# Patient Record
Sex: FEMALE | Race: WHITE | NOT HISPANIC OR LATINO | Employment: OTHER | URBAN - NONMETROPOLITAN AREA
[De-identification: names, ages, dates, MRNs, and addresses within clinical notes are randomized per-mention and may not be internally consistent; named-entity substitution may affect disease eponyms.]

---

## 2017-02-10 ENCOUNTER — TRANSCRIBE ORDERS (OUTPATIENT)
Dept: ADMINISTRATIVE | Facility: HOSPITAL | Age: 62
End: 2017-02-10

## 2017-02-10 DIAGNOSIS — Z12.31 ENCOUNTER FOR SCREENING MAMMOGRAM FOR MALIGNANT NEOPLASM OF BREAST: Primary | ICD-10-CM

## 2017-02-20 ENCOUNTER — HOSPITAL ENCOUNTER (OUTPATIENT)
Dept: MAMMOGRAPHY | Facility: HOSPITAL | Age: 62
Discharge: HOME | End: 2017-02-20
Attending: OBSTETRICS & GYNECOLOGY | Admitting: OBSTETRICS & GYNECOLOGY

## 2017-02-20 DIAGNOSIS — Z12.31 ENCOUNTER FOR SCREENING MAMMOGRAM FOR MALIGNANT NEOPLASM OF BREAST: ICD-10-CM

## 2017-02-20 PROCEDURE — G0202 SCR MAMMO BI INCL CAD: HCPCS

## 2017-02-20 PROCEDURE — 77063 BREAST TOMOSYNTHESIS BI: CPT

## 2017-02-21 ENCOUNTER — OFFICE VISIT (OUTPATIENT)
Dept: OBSTETRICS AND GYNECOLOGY | Facility: CLINIC | Age: 62
End: 2017-02-21

## 2017-02-21 VITALS
WEIGHT: 139 LBS | BODY MASS INDEX: 21.82 KG/M2 | DIASTOLIC BLOOD PRESSURE: 84 MMHG | HEIGHT: 67 IN | SYSTOLIC BLOOD PRESSURE: 126 MMHG

## 2017-02-21 DIAGNOSIS — Z85.41 HISTORY OF CERVICAL CANCER: ICD-10-CM

## 2017-02-21 DIAGNOSIS — Z87.412 H/O VULVAR DYSPLASIA: ICD-10-CM

## 2017-02-21 DIAGNOSIS — F17.200 SMOKER: ICD-10-CM

## 2017-02-21 DIAGNOSIS — Z01.419 WELL WOMAN EXAM WITH ROUTINE GYNECOLOGICAL EXAM: Primary | ICD-10-CM

## 2017-02-21 PROBLEM — C51.9 VULVAR CANCER (HCC): Status: ACTIVE | Noted: 2017-02-21

## 2017-02-21 PROCEDURE — G0123 SCREEN CERV/VAG THIN LAYER: HCPCS | Performed by: NURSE PRACTITIONER

## 2017-02-21 PROCEDURE — G0101 CA SCREEN;PELVIC/BREAST EXAM: HCPCS | Performed by: NURSE PRACTITIONER

## 2017-02-21 RX ORDER — AMLODIPINE BESYLATE 10 MG/1
10 TABLET ORAL DAILY
COMMUNITY
End: 2022-06-29

## 2017-02-21 RX ORDER — CARVEDILOL 25 MG/1
25 TABLET ORAL 2 TIMES DAILY WITH MEALS
COMMUNITY

## 2017-02-21 RX ORDER — ALPRAZOLAM 1 MG/1
1 TABLET ORAL 3 TIMES DAILY PRN
COMMUNITY

## 2017-02-21 RX ORDER — OXYCODONE AND ACETAMINOPHEN 10; 325 MG/1; MG/1
1 TABLET ORAL EVERY 6 HOURS PRN
COMMUNITY
End: 2018-09-05

## 2017-02-21 RX ORDER — LISINOPRIL 10 MG/1
10 TABLET ORAL DAILY
COMMUNITY
End: 2017-08-22 | Stop reason: SDDI

## 2017-02-21 NOTE — PROGRESS NOTES
Subjective   Robert Daniels is a 62 y.o. female  YOB: 1955    Est PT is here today for yearly visit.  Pt states that she is doing good with no c/o  Pt does need order for Mammo today as well      Chief Complaint   Patient presents with   • Gynecologic Exam     Here for annual exam, pap smear. LPS 12-9-15 WNL  Had hysterectomy for cervical cancer. Hx of vulvar cancer, Had mammogram yesterday.  Noticed recently a small place on anus that seems like a small wart.        HPI    The following portions of the patient's history were reviewed and updated as appropriate: allergies, current medications, past family history, past medical history, past social history, past surgical history and problem list.    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     Social History Main Topics   • Smoking status: Current Every Day Smoker     Packs/day: 1.00     Types: Cigarettes   • Smokeless tobacco: Never Used   • Alcohol use No   • Drug use: No   • Sexual activity: Not on file     Other Topics Concern   • Not on file     Social History Narrative         Current Outpatient Prescriptions:   •  ALPRAZolam (XANAX) 1 MG tablet, Take 1 mg by mouth Daily., Disp: , Rfl:   •  amLODIPine (NORVASC) 10 MG tablet, Take 10 mg by mouth Daily., Disp: , Rfl:   •  carvedilol (COREG) 25 MG tablet, Take 25 mg by mouth 2 (Two) Times a Day With Meals., Disp: , Rfl:    •  KRILL OIL PO, Take 1 tablet/day by mouth Daily., Disp: , Rfl:   •  lisinopril (PRINIVIL,ZESTRIL) 10 MG tablet, Take 10 mg by mouth Daily., Disp: , Rfl:   •  metFORMIN (GLUCOPHAGE) 500 MG tablet, Take 500 mg by mouth 2 (Two) Times a Day With Meals., Disp: , Rfl:   •  oxyCODONE-acetaminophen (PERCOCET)  MG per tablet, Take 1 tablet by mouth Every 6 (Six) Hours As Needed for moderate pain (4-6) (as needed)., Disp: , Rfl:     Menstrual History:  OB History      Para Term  AB TAB SAB Ectopic Multiple Living    7 7 7 0 0 0 0 0 0 7           No LMP recorded. Patient has had a hysterectomy.    Sexual History:         Could not be calculated    Past Surgical History   Procedure Laterality Date   •  section     • Hysterectomy       Due to cancer.  STEVEN BSO       Review of Systems   Constitutional: Negative for activity change, appetite change, chills, diaphoresis, fatigue, fever and unexpected weight change.   HENT: Negative for congestion, ear discharge, ear pain, facial swelling, hearing loss, mouth sores, nosebleeds, postnasal drip, rhinorrhea, sinus pressure, sneezing, sore throat, tinnitus, trouble swallowing and voice change.    Eyes: Negative for photophobia, pain, discharge, redness, itching and visual disturbance.   Respiratory: Negative for apnea, cough, choking, chest tightness and shortness of breath.    Cardiovascular: Negative for chest pain, palpitations and leg swelling.   Gastrointestinal: Negative for abdominal distention, abdominal pain, anal bleeding, blood in stool, constipation, diarrhea, nausea, rectal pain and vomiting.   Endocrine: Negative for cold intolerance and heat intolerance.   Genitourinary: Negative for decreased urine volume, difficulty urinating, dyspareunia, flank pain, frequency, genital sores, hematuria, menstrual problem, pelvic pain, urgency, vaginal bleeding, vaginal discharge and vaginal pain.   Musculoskeletal: Negative for arthralgias, back pain,  joint swelling and myalgias.   Skin: Negative for color change and rash.   Allergic/Immunologic: Negative for environmental allergies.   Neurological: Negative for dizziness, syncope, weakness, numbness and headaches.   Hematological: Negative for adenopathy.   Psychiatric/Behavioral: Negative for agitation, confusion and sleep disturbance. The patient is not nervous/anxious.        Objective   Physical Exam   Constitutional: She is oriented to person, place, and time. She appears well-developed and well-nourished.   HENT:   Head: Normocephalic.   Right Ear: External ear normal.   Left Ear: External ear normal.   Nose: Nose normal.   Mouth/Throat: Oropharynx is clear and moist.   Eyes: Conjunctivae are normal. Pupils are equal, round, and reactive to light.   Neck: Normal range of motion. Neck supple. Carotid bruit is not present. No thyromegaly present.   Cardiovascular: Regular rhythm, normal heart sounds and intact distal pulses.    No murmur heard.  Pulmonary/Chest: Effort normal and breath sounds normal. No respiratory distress. Right breast exhibits no inverted nipple, no mass, no nipple discharge, no skin change and no tenderness. Left breast exhibits no inverted nipple, no mass, no nipple discharge, no skin change and no tenderness. Breasts are symmetrical. There is no breast swelling.   Abdominal: Soft. Bowel sounds are normal. She exhibits no distension and no mass. There is no tenderness. There is no guarding. No hernia. Hernia confirmed negative in the right inguinal area and confirmed negative in the left inguinal area.   Genitourinary: Vagina normal. Rectal exam shows no external hemorrhoid, no internal hemorrhoid, no fissure, no mass, no tenderness and anal tone normal. No breast tenderness, discharge or bleeding. There is no rash, tenderness, lesion or injury on the right labia. There is no rash, tenderness, lesion or injury on the left labia. No vaginal discharge found.   Genitourinary Comments:  "Cervix, Uterus and Adnexa are surgically absent.  Urethra and urethral meatus normal  Bladder, normal no prolapse  Perineum and anus examined and without lesions   Musculoskeletal: Normal range of motion. She exhibits no edema or tenderness.   Lymphadenopathy:        Head (right side): No submental, no submandibular, no tonsillar, no preauricular, no posterior auricular and no occipital adenopathy present.        Head (left side): No submental, no submandibular, no tonsillar, no preauricular, no posterior auricular and no occipital adenopathy present.     She has no cervical adenopathy.        Right cervical: No superficial cervical, no deep cervical and no posterior cervical adenopathy present.       Left cervical: No superficial cervical, no deep cervical and no posterior cervical adenopathy present.     She has no axillary adenopathy.        Right: No inguinal adenopathy present.        Left: No inguinal adenopathy present.   Neurological: She is alert and oriented to person, place, and time. Coordination normal.   Skin: Skin is warm and dry. No bruising and no rash noted. No erythema.   Psychiatric: She has a normal mood and affect. Her behavior is normal. Judgment and thought content normal.   Nursing note and vitals reviewed.        Vitals:    02/21/17 0831   BP: 126/84   BP Location: Left arm   Patient Position: Sitting   Cuff Size: Adult   Weight: 139 lb (63 kg)   Height: 66.5\" (168.9 cm)       Robert was seen today for gynecologic exam.    Diagnoses and all orders for this visit:    Well woman exam with routine gynecological exam  Comments:  Normal well woman exam.  Thin prep done - hx of cervical ('94) and vulvar cancer ('09).  Mammogram done yesterday - wnl.    Orders:  -     Liquid-based Pap Smear, Screening        Body mass index is 22.1 kg/(m^2).     Non-Smoker    MyChart Instructions Given       "

## 2017-02-22 LAB
GEN CATEG CVX/VAG CYTO-IMP: NORMAL
LAB AP CASE REPORT: NORMAL
LAB AP GYN ADDITIONAL INFORMATION: NORMAL
Lab: NORMAL
PATH INTERP SPEC-IMP: NORMAL
STAT OF ADQ CVX/VAG CYTO-IMP: NORMAL

## 2017-08-22 ENCOUNTER — OFFICE VISIT (OUTPATIENT)
Dept: VASCULAR SURGERY | Facility: CLINIC | Age: 62
End: 2017-08-22

## 2017-08-22 VITALS
HEART RATE: 66 BPM | SYSTOLIC BLOOD PRESSURE: 132 MMHG | DIASTOLIC BLOOD PRESSURE: 78 MMHG | BODY MASS INDEX: 21.35 KG/M2 | HEIGHT: 67 IN | WEIGHT: 136 LBS

## 2017-08-22 DIAGNOSIS — I10 ESSENTIAL HYPERTENSION: ICD-10-CM

## 2017-08-22 DIAGNOSIS — Z72.0 TOBACCO ABUSE: ICD-10-CM

## 2017-08-22 DIAGNOSIS — I71.40 ABDOMINAL AORTIC ANEURYSM (AAA) WITHOUT RUPTURE (HCC): Primary | ICD-10-CM

## 2017-08-22 DIAGNOSIS — I65.23 BILATERAL CAROTID ARTERY STENOSIS: ICD-10-CM

## 2017-08-22 PROCEDURE — 99204 OFFICE O/P NEW MOD 45 MIN: CPT | Performed by: SURGERY

## 2017-08-22 PROCEDURE — 99406 BEHAV CHNG SMOKING 3-10 MIN: CPT | Performed by: SURGERY

## 2017-08-22 RX ORDER — CITALOPRAM 40 MG/1
20 TABLET ORAL DAILY
COMMUNITY

## 2017-08-22 RX ORDER — ASPIRIN 81 MG/1
81 TABLET ORAL DAILY
COMMUNITY
End: 2020-03-05

## 2017-08-22 NOTE — PROGRESS NOTES
2017      Titi Bullock MD  [address] [place] [address]    Robert Daniels  1955    Chief Complaint   Patient presents with   • Aortic Aneurysm     New referral per Dr Bullock.Has had back and leg pain.       Dear Titi Bullock MD:      HPI  I had the pleasure of seeing your patient Robert Daniels in the office today.  Thank you kindly for this consultation.  As you recall, Robert Daniels is a 62 y.o.  female who you are currently following for routine health maintenance.  She has a history of back problems and was having pain down both legs.  She had an incidental finding of a saccular 1.7 cm aneurysm.  She does smoke about 3/4 pack of cigarettes per day.  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  STEVEN BSO       Family History   Problem Relation Age of Onset   • Breast cancer Sister      Unknown   • Hypertension Mother    • Heart attack Brother    • Heart attack Brother    • Heart attack Brother    • Heart attack Brother    • Colon cancer Neg Hx    • Ovarian cancer Neg Hx        Social History     Social History   • Marital status:      Spouse name: N/A   • Number of children: N/A   • Years of education: N/A     Occupational History   • Not on file.     Social History Main Topics   • Smoking status: Current Every Day Smoker     Packs/day: 0.75     Types: Cigarettes   • Smokeless tobacco: Never Used   • Alcohol use No   • Drug use: No   • Sexual activity: Defer     Other Topics Concern   • Not on file     Social History  "Narrative       Allergies   Allergen Reactions   • Pravachol [Pravastatin Sodium] Myalgia       Prior to Admission medications    Medication Sig Start Date End Date Taking? Authorizing Provider   ALPRAZolam (XANAX) 1 MG tablet Take 1 mg by mouth Daily.   Yes Historical Provider, MD   amLODIPine (NORVASC) 10 MG tablet Take 10 mg by mouth Daily.   Yes Historical Provider, MD   aspirin 81 MG EC tablet Take 81 mg by mouth Daily.   Yes Historical Provider, MD   carvedilol (COREG) 25 MG tablet Take 25 mg by mouth 2 (Two) Times a Day With Meals.   Yes Historical Provider, MD   citalopram (CeleXA) 40 MG tablet Take 40 mg by mouth Daily.   Yes Historical Provider, MD   KRILL OIL PO Take 1 tablet/day by mouth Daily.   Yes Historical Provider, MD   metFORMIN (GLUCOPHAGE) 500 MG tablet Take 500 mg by mouth 2 (Two) Times a Day With Meals.   Yes Historical Provider, MD   oxyCODONE-acetaminophen (PERCOCET)  MG per tablet Take 1 tablet by mouth Every 6 (Six) Hours As Needed for moderate pain (4-6) (as needed).   Yes Historical Provider, MD   lisinopril (PRINIVIL,ZESTRIL) 10 MG tablet Take 10 mg by mouth Daily.  8/22/17  Historical Provider, MD       Review of Systems   Constitutional: Negative.    HENT: Negative.    Eyes: Negative.    Respiratory: Negative.    Cardiovascular: Negative.    Gastrointestinal: Negative.    Endocrine: Negative.    Genitourinary: Negative.    Musculoskeletal: Positive for back pain.   Skin: Negative.    Allergic/Immunologic: Negative.    Neurological: Negative.    Hematological: Negative.    Psychiatric/Behavioral: Negative.        /78  Pulse 66  Ht 66.5\" (168.9 cm)  Wt 136 lb (61.7 kg)  BMI 21.62 kg/m2  Physical Exam   Constitutional: She is oriented to person, place, and time. She appears well-developed and well-nourished. No distress.   HENT:   Head: Normocephalic and atraumatic.   Mouth/Throat: Oropharynx is clear and moist.   Eyes: Pupils are equal, round, and reactive to light. No " scleral icterus.   Neck: Normal range of motion. Neck supple. No JVD present. Carotid bruit is not present. No thyromegaly present.   Cardiovascular: Normal rate, regular rhythm, S2 normal, normal heart sounds, intact distal pulses and normal pulses.  Exam reveals no gallop and no friction rub.    No murmur heard.  Pulmonary/Chest: Effort normal and breath sounds normal.   Abdominal: Soft. Normal aorta and bowel sounds are normal. There is no hepatosplenomegaly.   Musculoskeletal: Normal range of motion.   Neurological: She is alert and oriented to person, place, and time. No cranial nerve deficit.   Skin: Skin is warm and dry. She is not diaphoretic.   Psychiatric: She has a normal mood and affect. Her behavior is normal. Judgment and thought content normal.   Nursing note and vitals reviewed.      No results found.    Patient Active Problem List   Diagnosis   • H/O vulvar dysplasia   • History of cervical cancer   • Tobacco abuse   • Hypertension   • Diabetes mellitus   • Aneurysm of infrarenal abdominal aorta         ICD-10-CM ICD-9-CM   1. Abdominal aortic aneurysm (AAA) without rupture I71.4 441.4   2. Bilateral carotid artery stenosis I65.23 433.10     433.30   3. Essential hypertension I10 401.9   4. Tobacco abuse Z72.0 305.1           Plan: After thoroughly evaluating Robert Daniels, I believe the best course of action is to Proceed with further testing.  I like for her to undergo a CTA of the abdomen and pelvis to better evaluate this saccular aneurysm.  I will see her back in the next week or so to determine if anything endovascularly needs to be done.  I would also like for her to undergo a carotid duplex to check for carotid occlusive disease. I did  extensively on smoking cessation, and the patient was advised of the continued risks of smoking.  I provided over 10 minutes counseling on this matter.  I also counseled her on risk factor modification with regards to her hypertension and diabetes  mellitus.  The patient can continue taking her current medication regimen as previously planned.  This was all discussed in full with complete understanding.    Thank you for allowing me to participate in the care of your patient.  Please do not hesitate with any questions or concerns.  I will keep you aware of any further encounters with Robert Daniels.        Sincerely yours,         DO Titi Ayoub MD

## 2017-08-29 ENCOUNTER — HOSPITAL ENCOUNTER (OUTPATIENT)
Dept: ULTRASOUND IMAGING | Facility: HOSPITAL | Age: 62
Discharge: HOME | End: 2017-08-29

## 2017-08-29 ENCOUNTER — HOSPITAL ENCOUNTER (OUTPATIENT)
Dept: CT IMAGING | Facility: HOSPITAL | Age: 62
Discharge: HOME | End: 2017-08-29
Admitting: NURSE PRACTITIONER

## 2017-08-29 ENCOUNTER — OFFICE VISIT (OUTPATIENT)
Dept: VASCULAR SURGERY | Facility: CLINIC | Age: 62
End: 2017-08-29

## 2017-08-29 VITALS
HEART RATE: 96 BPM | SYSTOLIC BLOOD PRESSURE: 142 MMHG | DIASTOLIC BLOOD PRESSURE: 96 MMHG | WEIGHT: 135 LBS | BODY MASS INDEX: 21.19 KG/M2 | HEIGHT: 67 IN | OXYGEN SATURATION: 97 %

## 2017-08-29 DIAGNOSIS — Z72.0 TOBACCO ABUSE: ICD-10-CM

## 2017-08-29 DIAGNOSIS — I71.40 ABDOMINAL AORTIC ANEURYSM (AAA) WITHOUT RUPTURE (HCC): ICD-10-CM

## 2017-08-29 DIAGNOSIS — I71.40 ABDOMINAL AORTIC ANEURYSM (AAA) WITHOUT RUPTURE (HCC): Primary | ICD-10-CM

## 2017-08-29 DIAGNOSIS — I65.23 BILATERAL CAROTID ARTERY STENOSIS: ICD-10-CM

## 2017-08-29 DIAGNOSIS — I10 ESSENTIAL HYPERTENSION: ICD-10-CM

## 2017-08-29 LAB — CREAT BLDA-MCNC: 0.7 MG/DL (ref 0.6–1.3)

## 2017-08-29 PROCEDURE — 93880 EXTRACRANIAL BILAT STUDY: CPT | Performed by: SURGERY

## 2017-08-29 PROCEDURE — 99213 OFFICE O/P EST LOW 20 MIN: CPT | Performed by: NURSE PRACTITIONER

## 2017-08-29 PROCEDURE — 82565 ASSAY OF CREATININE: CPT

## 2017-08-29 PROCEDURE — 74174 CTA ABD&PLVS W/CONTRAST: CPT

## 2017-08-29 PROCEDURE — 93880 EXTRACRANIAL BILAT STUDY: CPT

## 2017-08-29 PROCEDURE — 0 IOPAMIDOL PER 1 ML: Performed by: NURSE PRACTITIONER

## 2017-08-29 RX ADMIN — IOPAMIDOL 150 ML: 755 INJECTION, SOLUTION INTRAVENOUS at 13:45

## 2017-08-29 NOTE — PROGRESS NOTES
"08/29/2017      Titi Bullock MD  [address]  [facility] [address]        Robert Daniels  1955    Chief Complaint   Patient presents with   • Follow-up     Patient denies any stroke like symptoms or leg pain. US Carotid Bilateral and CT Angiogram Abdomen Pelvis With & Without Contrast 08/29/17   • Aortic Aneurysm       Dear Titi Bullock MD:    HPI     I had the pleasure of seeing you patient in the office today for follow up.  As you recall, the patient is a 62 y.o. female who we recently saw for an abdominal aortic aneurysm.  She has a history of back problems and was having pain down both legs.  She had an incidental finding of a saccular 1.7 cm aneurysm found on a noncontrast CT.  She does smoke about 3/4 pack of cigarettes per day.  She denies any family history of aneurysms.    She did have noninvasive testing performed today, which I did review in office.     /96  Pulse 96  Ht 66.5\" (168.9 cm)  Wt 135 lb (61.2 kg)  SpO2 97%  BMI 21.46 kg/m2  Physical Exam  Constitutional: She is oriented to person, place, and time. She appears well-developed and well-nourished. No distress.   HENT:   Head: Normocephalic and atraumatic.   Mouth/Throat: Oropharynx is clear and moist.   Eyes: Pupils are equal, round, and reactive to light. No scleral icterus.   Neck: Normal range of motion. Neck supple. No JVD present. Carotid bruit is not present. No thyromegaly present.   Cardiovascular: Normal rate, regular rhythm, S2 normal, normal heart sounds, intact distal pulses and normal pulses.  Exam reveals no gallop and no friction rub.    No murmur heard.  Pulmonary/Chest: Effort normal and breath sounds normal.   Abdominal: Soft. Normal aorta and bowel sounds are normal. There is no hepatosplenomegaly.   Musculoskeletal: Normal range of motion.   Neurological: She is alert and oriented to person, place, and time. No cranial nerve deficit.   Skin: Skin is warm and dry. She is not diaphoretic. "   Psychiatric: She has a normal mood and affect. Her behavior is normal. Judgment and thought content normal.   Nursing note and vitals reviewed.    DIAGNOSTIC DATA:    Ct Angiogram Abdomen Pelvis With & Without Contrast    Result Date: 8/29/2017  Narrative: EXAMINATION: CT ANGIOGRAM ABDOMEN PELVIS W WO CONTRAST-   8/29/2017 12:46 PM CDT  HISTORY: AAA; I71.4-Abdominal aortic aneurysm, without rupture  In order to have a CT radiation dose as low as reasonably achievable Automated Exposure Control was utilized for adjustment of the mA and/or KV according to patient size.  DLP in mGycm= 506.  CT angiography protocol. CT imaging with bolus IV contrast injection. Under concurrent supervision axial, sagittal, coronal, and three-dimensional data sets were constructed.  The aorta shows diffuse atherosclerotic irregularity and wall calcification with a maximum diameter (3.5 cm below the renal arteries) of 4.4 x 3.6 cm. Slightly more distal the aorta diameter is 3.4 x 3.4 cm. Moderate mural thrombus is present. No iliac artery dilation.  The upper abdominal aorta just below the level of the diaphragm measures 3.2 x 3.3 cm.  The lower 60% of the right kidney is atrophic and hypoperfused representing chronic ischemic change. The upper pole of the right kidney is normally perfused. The left kidney shows normal size and cortical enhancement.  Normal heart size. Mildly hyperexpanded lung bases. Normal liver, pancreas, and spleen. Mildly distended gallbladder with small calcified gallstones. No biliary dilation. Normal and symmetric adrenal glands.  No bowel dilation. No appendicitis or diverticulitis. No pelvic mass or fluid.  Summary: 1. Atherosclerotic irregularity and wall thickening throughout the aorta with maximum distal diameter of 4.4 x 3.6 cm. 2. Chronic ischemia of the lower half of the right kidney. 3. Gallstones.          This report was finalized on 08/29/2017 13:57 by Dr. Lucio Cheng MD.      Patient Active Problem  List   Diagnosis   • H/O vulvar dysplasia   • History of cervical cancer   • Tobacco abuse   • Hypertension   • Diabetes mellitus   • Aneurysm of infrarenal abdominal aorta         ICD-10-CM ICD-9-CM   1. Abdominal aortic aneurysm (AAA) without rupture I71.4 441.4   2. Tobacco abuse Z72.0 305.1   3. Essential hypertension I10 401.9       PLAN: After thoroughly evaluating Robert Daniels, I believe the best course of action is to remain conservative from a vascular standpoint.  We will see Robert Daniels back in 1 year with repeat noninvasive testing for continued surveillance.  Dr. Ruiz did review her CTA.  We did discuss smoking being the number one cause of growth.  We also discussed vascular risk factors as they pertain to progression of vascular disease including controlling her hypertension, diabetes mellitus, and smoking cessation.  The patient is to continue taking their medications as previously discussed.   This was all discussed in full with complete understanding.  Thank you for allowing me to participate in the care of your patient.  Please do not hesitate to call with any questions or concerns.  We will keep you aware of any further encounters with Robert Daniels.      Sincerely Yours,      JASON Palumbo

## 2017-08-30 ENCOUNTER — TELEPHONE (OUTPATIENT)
Dept: VASCULAR SURGERY | Facility: CLINIC | Age: 62
End: 2017-08-30

## 2017-08-30 NOTE — TELEPHONE ENCOUNTER
Patient called inquired why there was a difference in test from her hospital and testing with contrast for AAA.Informed dye increased accuracy of test.Reinforced importance of smoking cessation and keeping blood pressure under control.She is to follow up with repeat testing next year.Voiced understanding.

## 2018-04-27 DIAGNOSIS — Z12.31 VISIT FOR SCREENING MAMMOGRAM: Primary | ICD-10-CM

## 2018-04-27 NOTE — PROGRESS NOTES
2/21/17 was pt last yearly with [facility] she has medicare and next yearly due 2/2019 she request mammogram order

## 2018-04-30 ENCOUNTER — HOSPITAL ENCOUNTER (OUTPATIENT)
Dept: MAMMOGRAPHY | Facility: HOSPITAL | Age: 63
Discharge: HOME | End: 2018-04-30
Admitting: NURSE PRACTITIONER

## 2018-04-30 DIAGNOSIS — Z12.31 VISIT FOR SCREENING MAMMOGRAM: ICD-10-CM

## 2018-04-30 PROCEDURE — 77067 SCR MAMMO BI INCL CAD: CPT

## 2018-04-30 PROCEDURE — 77063 BREAST TOMOSYNTHESIS BI: CPT

## 2018-05-17 ENCOUNTER — TELEPHONE (OUTPATIENT)
Dept: VASCULAR SURGERY | Facility: CLINIC | Age: 63
End: 2018-05-17

## 2018-05-17 NOTE — TELEPHONE ENCOUNTER
Patient called regarding test she had at [facility]. Asked if any change in aneurysm size.Informed it was the same as 8/29/17 study.

## 2018-05-29 NOTE — PROGRESS NOTES
Subjective    Ms. Daniels is 63 y.o. female    Chief Complaint: Atrophic Kidney    History of Present Illness     Abnormal radiographic finding   This patient presents with a possible abnormal finding of the right kidneyon CT lumbar spine. This is a  new finding. This test was done 2 week(s) ago. Onset is sudden. This was done in context of evaluation for trauma. Symptoms include Back pain.     The following portions of the patient's history were reviewed and updated as appropriate: allergies, current medications, past family history, past medical history, past social history, past surgical history and problem list.    Review of Systems   Constitutional: Negative for appetite change, diaphoresis and fever.   HENT: Negative for facial swelling and sore throat.    Eyes: Negative for discharge and visual disturbance.   Respiratory: Negative for cough and shortness of breath.    Cardiovascular: Negative for chest pain and leg swelling.   Gastrointestinal: Negative for anal bleeding and vomiting.   Endocrine: Negative for cold intolerance and heat intolerance.   Genitourinary: Positive for flank pain. Negative for frequency, hematuria, pelvic pain and urgency.   Musculoskeletal: Negative for back pain and gait problem.   Skin: Negative for pallor and rash.   Allergic/Immunologic: Negative for food allergies and immunocompromised state.   Neurological: Negative for seizures and headaches.   Hematological: Negative for adenopathy. Does not bruise/bleed easily.   Psychiatric/Behavioral: Negative for dysphoric mood, self-injury and suicidal ideas.         Current Outpatient Prescriptions:   •  ALPRAZolam (XANAX) 1 MG tablet, Take 1 mg by mouth Daily., Disp: , Rfl:   •  amLODIPine (NORVASC) 10 MG tablet, Take 10 mg by mouth Daily., Disp: , Rfl:   •  aspirin 81 MG EC tablet, Take 81 mg by mouth Daily., Disp: , Rfl:   •  carvedilol (COREG) 25 MG tablet, Take 25 mg by mouth 2 (Two) Times a Day With Meals., Disp: , Rfl:   •   "citalopram (CeleXA) 40 MG tablet, Take 40 mg by mouth Daily., Disp: , Rfl:   •  KRILL OIL PO, Take 1 tablet/day by mouth Daily., Disp: , Rfl:   •  metFORMIN (GLUCOPHAGE) 500 MG tablet, Take 500 mg by mouth 2 (Two) Times a Day With Meals., Disp: , Rfl:   •  oxyCODONE-acetaminophen (PERCOCET)  MG per tablet, Take 1 tablet by mouth Every 6 (Six) Hours As Needed for moderate pain (4-6) (as needed)., Disp: , Rfl:     Past Medical History:   Diagnosis Date   • Aneurysm of infrarenal abdominal aorta     1.7 cm   • Anxiety    • Bradycardia    • 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  [facility]   • OOPHORECTOMY         Social History     Social History   • Marital status:      Social History Main Topics   • Smoking status: Current Every Day Smoker     Packs/day: 0.75     Types: Cigarettes   • Smokeless tobacco: Never Used   • Alcohol use No   • Drug use: No   • Sexual activity: Defer     Other Topics Concern   • Not on file       Family History   Problem Relation Age of Onset   • Breast cancer Sister         Unknown   • Hypertension Mother    • Heart attack Brother    • Heart attack Brother    • Heart attack Brother    • Heart attack Brother    • Colon cancer Neg Hx    • Ovarian cancer Neg Hx        Objective    Temp 98.2 °F (36.8 °C)   Ht 167.6 cm (66\")   Wt 65.5 kg (144 lb 6.4 oz)   BMI 23.31 kg/m²     Physical Exam   Constitutional: She is oriented to person, place, and time. She appears well-developed and well-nourished. No distress.   HENT:   Head: Normocephalic and atraumatic.   Right Ear: External ear and ear canal normal.   Left Ear: External ear and ear canal normal.   Nose: No nasal deformity. No " epistaxis.   Mouth/Throat: Oropharynx is clear and moist. Mucous membranes are not pale, not dry and not cyanotic. Normal dentition. No oropharyngeal exudate.   Neck: Trachea normal. No tracheal tenderness present. No tracheal deviation present. No thyroid mass and no thyromegaly present.   Pulmonary/Chest: Effort normal. No accessory muscle usage. No respiratory distress. Chest wall is not dull to percussion (No flatness or hyperresonance). She exhibits no mass and no tenderness.   On palpation, no tactile fremitus. All movements are symmetric. No intercostal retraction noted.    Abdominal: Soft. Normal appearance. She exhibits no distension and no mass. There is no hepatosplenomegaly. There is no tenderness. No hernia.   Rectal examination or stool specimen is not indicated.    Musculoskeletal:   Normal gait and station. The spine, ribs, and pelvis are examined. No obvious misalignment or asymmetry. ROM is reasonable for age. No instability. No obvious atrophy, flaccidity or spasticity.    Lymphadenopathy:     She has no cervical adenopathy.        Right: No inguinal adenopathy present.        Left: No inguinal adenopathy present.   Neurological: She is alert and oriented to person, place, and time.   Skin: Skin is warm, dry and intact. No lesion and no rash noted. She is not diaphoretic. No cyanosis. No pallor. Nails show no clubbing.   On palpation, there were no induration, subcutaneous nodules, or tightening   Psychiatric: Her speech is normal and behavior is normal. Judgment and thought content normal. Her mood appears not anxious. Her affect is not labile. She does not exhibit a depressed mood.   Vitals reviewed.          Results for orders placed or performed in visit on 05/30/18   POC Urinalysis Dipstick, Automated   Result Value Ref Range    Color Yellow Yellow, Straw, Dark Yellow, [place]    Clarity, UA Clear Clear    Specific Gravity  1.015 1.005 - 1.030    pH, Urine 5.0 5.0 - 8.0    Leukocytes Negative  Negative    Nitrite, UA Negative Negative    Protein, POC Trace (A) Negative mg/dL    Glucose, UA >=1000 mg/dL (3+) (A) Negative, 1000 mg/dL (3+) mg/dL    Ketones, UA Negative Negative    Urobilinogen, UA Normal Normal    Bilirubin Negative Negative    Blood, UA Trace (A) Negative   CT independent review  The CT scan of the abdomen/pelvis done without contrast is available for me to review.  Treatment recommendations require an independent review.  First I scanned the liver, spleen, and bowel pattern.  The retroperitoneum including the major vessels and lymphatic packages are briefly reviewed.  This film as been reviewed by the radiologist to determine any non urologic abnormalities that are present.  The kidneys are closely inspected for size, symmetry, contour, parenchymal thickness, perinephric reaction, presence of calcifications, and intrarenal dilation of the collecting system.  The ureters are inspected for their course, caliber, and any calcifications.  The bladder is inspected for its thickness, size, and presence of any calcifications.  This scan shows:    The right kidney appears Nonobstructing nephrolithiasis of atrophic right kidney no obstruction    The left kidney appears hypertrophic left kidney no evidence of nephrolithiasis on or cystic masses.    The bladder appears normal on this non-contrasted CT scan.  The bladder appears normal in thickness.  There no masses or stones seen on this exam.      Assessment and Plan    Robert was seen today for atrophic kidney.    Diagnoses and all orders for this visit:    Atrophic kidney  -     POC Urinalysis Dipstick, Automated  -     Basic Metabolic Panel; Future  -     NM Renal Function; Future    Nephrolithiasis        I reviewed her CT scan with the findings mentioned above.  I am many get a BMP to check her overall renal function as well as a renal scan to check the differential function.  She is not symptomatic from this atrophic kidney and I would not  recommend any sort of invasive procedure at this point.

## 2018-05-30 ENCOUNTER — OFFICE VISIT (OUTPATIENT)
Dept: UROLOGY | Facility: CLINIC | Age: 63
End: 2018-05-30

## 2018-05-30 ENCOUNTER — TELEPHONE (OUTPATIENT)
Dept: UROLOGY | Facility: CLINIC | Age: 63
End: 2018-05-30

## 2018-05-30 VITALS — TEMPERATURE: 98.2 F | BODY MASS INDEX: 23.21 KG/M2 | WEIGHT: 144.4 LBS | HEIGHT: 66 IN

## 2018-05-30 DIAGNOSIS — N26.1 ATROPHIC KIDNEY: Primary | ICD-10-CM

## 2018-05-30 DIAGNOSIS — N26.1 ATROPHIC KIDNEY: ICD-10-CM

## 2018-05-30 DIAGNOSIS — N20.0 NEPHROLITHIASIS: ICD-10-CM

## 2018-05-30 LAB
BILIRUB BLD-MCNC: NEGATIVE MG/DL
CLARITY, POC: CLEAR
COLOR UR: YELLOW
GLUCOSE UR STRIP-MCNC: ABNORMAL MG/DL
KETONES UR QL: NEGATIVE
LEUKOCYTE EST, POC: NEGATIVE
NITRITE UR-MCNC: NEGATIVE MG/ML
PH UR: 5 [PH] (ref 5–8)
PROT UR STRIP-MCNC: ABNORMAL MG/DL
RBC # UR STRIP: ABNORMAL /UL
SP GR UR: 1.01 (ref 1–1.03)
UROBILINOGEN UR QL: NORMAL

## 2018-05-30 PROCEDURE — 81001 URINALYSIS AUTO W/SCOPE: CPT | Performed by: UROLOGY

## 2018-05-30 PROCEDURE — 99203 OFFICE O/P NEW LOW 30 MIN: CPT | Performed by: UROLOGY

## 2018-05-31 NOTE — TELEPHONE ENCOUNTER
I called pt and discussed her UA results from yesterday with her. Pt voiced understanding of everything.

## 2018-06-15 ENCOUNTER — APPOINTMENT (OUTPATIENT)
Dept: NUCLEAR MEDICINE | Facility: HOSPITAL | Age: 63
End: 2018-06-15
Attending: UROLOGY

## 2018-06-19 ENCOUNTER — RESULTS ENCOUNTER (OUTPATIENT)
Dept: UROLOGY | Facility: CLINIC | Age: 63
End: 2018-06-19

## 2018-06-19 DIAGNOSIS — N26.1 ATROPHIC KIDNEY: ICD-10-CM

## 2018-06-19 DIAGNOSIS — N26.1 ATROPHIC KIDNEY: Primary | ICD-10-CM

## 2018-06-21 NOTE — PROGRESS NOTES
Subjective    Ms. Daniels is 63 y.o. female    Chief Complaint: Atrophic Kidney    History of Present Illness    Abnormal radiographic finding   This patient presents with a possible abnormal finding of the right kidneyon CT lumbar spine. This is a  new finding. This test was done 2 week(s) ago. Onset is sudden. This was done in context of evaluation for trauma. Symptoms include Back pain.     The following portions of the patient's history were reviewed and updated as appropriate: allergies, current medications, past family history, past medical history, past social history, past surgical history and problem list.    Review of Systems   Constitutional: Negative for chills and fever.   Gastrointestinal: Negative for abdominal pain, anal bleeding and blood in stool.   Genitourinary: Positive for flank pain. Negative for frequency, hematuria and urgency.         Current Outpatient Prescriptions:   •  ALPRAZolam (XANAX) 1 MG tablet, Take 1 mg by mouth Daily., Disp: , Rfl:   •  amLODIPine (NORVASC) 10 MG tablet, Take 10 mg by mouth Daily., Disp: , Rfl:   •  aspirin 81 MG EC tablet, Take 81 mg by mouth Daily., Disp: , Rfl:   •  carvedilol (COREG) 25 MG tablet, Take 25 mg by mouth 2 (Two) Times a Day With Meals., Disp: , Rfl:   •  citalopram (CeleXA) 40 MG tablet, Take 40 mg by mouth Daily., Disp: , Rfl:   •  KRILL OIL PO, Take 1 tablet/day by mouth Daily., Disp: , Rfl:   •  metFORMIN (GLUCOPHAGE) 500 MG tablet, Take 500 mg by mouth 2 (Two) Times a Day With Meals., Disp: , Rfl:   •  oxyCODONE-acetaminophen (PERCOCET)  MG per tablet, Take 1 tablet by mouth Every 6 (Six) Hours As Needed for moderate pain (4-6) (as needed)., Disp: , Rfl:   No current facility-administered medications for this visit.     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  [facility]   • OOPHORECTOMY         Social History     Social History   • Marital status:      Social History Main Topics   • Smoking status: Current Every Day Smoker     Packs/day: 0.75     Types: Cigarettes   • Smokeless tobacco: Never Used   • Alcohol use No   • Drug use: No   • Sexual activity: Defer     Other Topics Concern   • Not on file       Family History   Problem Relation Age of Onset   • Breast cancer Sister         Unknown   • Hypertension Mother    • Heart attack Brother    • Heart attack Brother    • Heart attack Brother    • Heart attack Brother    • Colon cancer Neg Hx    • Ovarian cancer Neg Hx        Objective    Temp 98.2 °F (36.8 °C)   Ht 167.6 cm (66\")   Wt 64.3 kg (141 lb 12.8 oz)   BMI 22.89 kg/m²     Physical Exam   Constitutional: She is oriented to person, place, and time. She appears well-developed and well-nourished. No distress.   Pulmonary/Chest: Effort normal.   Abdominal: Soft. She exhibits no distension and no mass. There is no tenderness. There is no rebound and no guarding. No hernia.   Neurological: She is alert and oriented to person, place, and time.   Skin: Skin is warm and dry. She is not diaphoretic.   Psychiatric: She has a normal mood and affect.   Vitals reviewed.          Results for orders placed or performed in visit on 18   POC Urinalysis Dipstick, Multipro   Result Value Ref Range    Color Yellow Yellow, Straw, Dark Yellow, [place]    Clarity, UA Clear Clear    Glucose, UA Negative Negative, 1000 mg/dL (3+) mg/dL    Bilirubin Negative Negative    Ketones, UA Negative Negative    Specific Gravity  1.010 1.005 - 1.030    Blood, UA Trace (A) Negative    pH, Urine 6.0 5.0 - 8.0    Protein, POC Negative " Negative mg/dL    Urobilinogen, UA Normal Normal    Nitrite, UA Negative Negative    Leukocytes Negative Negative     Assessment and Plan    Robert was seen today for atrophic kidney.    Diagnoses and all orders for this visit:    Atrophic kidney  -     POC Urinalysis Dipstick, Multipro  -     NM Renal Function; Future  -     Basic Metabolic Panel; Future          I reviewed her CT scan with the findings with a right atrophic kidney.  Her creatinine is 0.94.  I reviewed her renal scan which shows 85% function on the left kidney with 15% function on the right.    Patient has a normal creatinine.  She is not having flank pain on the right and middle watch this.  She will return in 1 year with repeat renal scan and BMP.

## 2018-06-22 ENCOUNTER — OFFICE VISIT (OUTPATIENT)
Dept: UROLOGY | Facility: CLINIC | Age: 63
End: 2018-06-22

## 2018-06-22 ENCOUNTER — HOSPITAL ENCOUNTER (OUTPATIENT)
Dept: NUCLEAR MEDICINE | Facility: HOSPITAL | Age: 63
Discharge: HOME | End: 2018-06-22
Attending: UROLOGY

## 2018-06-22 VITALS — TEMPERATURE: 98.2 F | BODY MASS INDEX: 22.79 KG/M2 | HEIGHT: 66 IN | WEIGHT: 141.8 LBS

## 2018-06-22 DIAGNOSIS — N26.1 ATROPHIC KIDNEY: ICD-10-CM

## 2018-06-22 DIAGNOSIS — N26.1 ATROPHIC KIDNEY: Primary | ICD-10-CM

## 2018-06-22 LAB
BILIRUB BLD-MCNC: NEGATIVE MG/DL
CLARITY, POC: CLEAR
COLOR UR: YELLOW
GLUCOSE UR STRIP-MCNC: NEGATIVE MG/DL
KETONES UR QL: NEGATIVE
LEUKOCYTE EST, POC: NEGATIVE
NITRITE UR-MCNC: NEGATIVE MG/ML
PH UR: 6 [PH] (ref 5–8)
PROT UR STRIP-MCNC: NEGATIVE MG/DL
RBC # UR STRIP: ABNORMAL /UL
SP GR UR: 1.01 (ref 1–1.03)
UROBILINOGEN UR QL: NORMAL

## 2018-06-22 PROCEDURE — A9562 TC99M MERTIATIDE: HCPCS | Performed by: UROLOGY

## 2018-06-22 PROCEDURE — 81001 URINALYSIS AUTO W/SCOPE: CPT | Performed by: UROLOGY

## 2018-06-22 PROCEDURE — 0 TECHNETIUM MERTIATIDE: Performed by: UROLOGY

## 2018-06-22 PROCEDURE — 25010000002 FUROSEMIDE PER 20 MG: Performed by: UROLOGY

## 2018-06-22 PROCEDURE — 99213 OFFICE O/P EST LOW 20 MIN: CPT | Performed by: UROLOGY

## 2018-06-22 PROCEDURE — 78725 KIDNEY FUNCTION STUDY: CPT

## 2018-06-22 RX ORDER — FUROSEMIDE 10 MG/ML
40 INJECTION INTRAMUSCULAR; INTRAVENOUS
Status: COMPLETED | OUTPATIENT
Start: 2018-06-22 | End: 2018-06-22

## 2018-06-22 RX ADMIN — FUROSEMIDE 40 MG: 10 INJECTION, SOLUTION INTRAVENOUS at 09:20

## 2018-06-22 RX ADMIN — TECHNESCAN TC 99M MERTIATIDE 1 DOSE: 1 INJECTION, POWDER, LYOPHILIZED, FOR SOLUTION INTRAVENOUS at 08:56

## 2018-08-20 ENCOUNTER — HOSPITAL ENCOUNTER (OUTPATIENT)
Dept: CT IMAGING | Facility: HOSPITAL | Age: 63
Discharge: HOME | End: 2018-08-20
Admitting: NURSE PRACTITIONER

## 2018-08-20 DIAGNOSIS — I71.40 ABDOMINAL AORTIC ANEURYSM (AAA) WITHOUT RUPTURE (HCC): ICD-10-CM

## 2018-08-20 LAB — CREAT BLDA-MCNC: 0.7 MG/DL (ref 0.6–1.3)

## 2018-08-20 PROCEDURE — 82565 ASSAY OF CREATININE: CPT

## 2018-08-20 PROCEDURE — 74174 CTA ABD&PLVS W/CONTRAST: CPT

## 2018-08-20 PROCEDURE — 0 IOPAMIDOL PER 1 ML: Performed by: NURSE PRACTITIONER

## 2018-08-20 RX ADMIN — IOPAMIDOL 150 ML: 755 INJECTION, SOLUTION INTRAVENOUS at 08:12

## 2018-08-28 ENCOUNTER — TELEPHONE (OUTPATIENT)
Dept: VASCULAR SURGERY | Facility: CLINIC | Age: 63
End: 2018-08-28

## 2018-08-28 NOTE — TELEPHONE ENCOUNTER
Left message letting Ms Daniels know that we had to move her appointment from Thursday, August 30th, 2018 to Wednesday, September 05th, 2018 at 1015 am. Also advised if she had any questions or need to reschedule to please call the office at [phone].

## 2018-09-05 ENCOUNTER — OFFICE VISIT (OUTPATIENT)
Dept: VASCULAR SURGERY | Facility: CLINIC | Age: 63
End: 2018-09-05

## 2018-09-05 VITALS
HEIGHT: 67 IN | BODY MASS INDEX: 22.13 KG/M2 | DIASTOLIC BLOOD PRESSURE: 78 MMHG | SYSTOLIC BLOOD PRESSURE: 122 MMHG | HEART RATE: 57 BPM | WEIGHT: 141 LBS

## 2018-09-05 DIAGNOSIS — I71.43 ANEURYSM OF INFRARENAL ABDOMINAL AORTA (HCC): Primary | ICD-10-CM

## 2018-09-05 DIAGNOSIS — I10 ESSENTIAL HYPERTENSION: ICD-10-CM

## 2018-09-05 DIAGNOSIS — Z72.0 TOBACCO ABUSE: ICD-10-CM

## 2018-09-05 PROCEDURE — 99213 OFFICE O/P EST LOW 20 MIN: CPT | Performed by: NURSE PRACTITIONER

## 2018-09-05 RX ORDER — SIMVASTATIN 40 MG
40 TABLET ORAL NIGHTLY
COMMUNITY

## 2018-09-05 RX ORDER — OXYCODONE AND ACETAMINOPHEN 7.5; 325 MG/1; MG/1
1 TABLET ORAL EVERY 8 HOURS PRN
COMMUNITY
End: 2022-06-29

## 2018-09-05 RX ORDER — ALBUTEROL SULFATE 90 UG/1
2 AEROSOL, METERED RESPIRATORY (INHALATION) EVERY 6 HOURS PRN
COMMUNITY
End: 2022-06-29

## 2018-09-05 NOTE — PROGRESS NOTES
"09/05/2018       Titi Bullock MD  [address]  [facility] [address]        Robert Daniels  1955    Chief Complaint   Patient presents with   • Follow-up     Patient follow up for CTA of abd/pelvis results.Denies symptoms.       Dear Titi Bullock MD:    HPI     I had the pleasure of seeing you patient in the office today for follow up.  As you recall, the patient is a 63 y.o. female who we recently saw for an abdominal aortic aneurysm.  She has a history of back problems and was having pain down both legs.  She had an incidental finding of a saccular 1.7 cm aneurysm found on a noncontrast CT.  She does smoke about 3/4 pack of cigarettes per day.  She denies any family history of aneurysms.    She did have noninvasive testing performed today, which I did review in office.     /78   Pulse 57   Ht 168.9 cm (66.5\")   Wt 64 kg (141 lb)   BMI 22.42 kg/m²   Physical Exam   Constitutional: She is oriented to person, place, and time. She appears well-developed and well-nourished. No distress.   HENT:   Head: Normocephalic and atraumatic.   Mouth/Throat: Oropharynx is clear and moist.   Eyes: Pupils are equal, round, and reactive to light. No scleral icterus.   Neck: Normal range of motion. Neck supple. No JVD present. Carotid bruit is not present. No thyromegaly present.   Cardiovascular: Normal rate, regular rhythm, S2 normal, normal heart sounds, intact distal pulses and normal pulses.  Exam reveals no gallop and no friction rub.    No murmur heard.  Pulmonary/Chest: Effort normal and breath sounds normal.   Abdominal: Soft. Normal appearance and bowel sounds are normal. She exhibits pulsatile midline mass. There is no hepatosplenomegaly. There is no tenderness.   Musculoskeletal: Normal range of motion.   Neurological: She is alert and oriented to person, place, and time. No cranial nerve deficit.   Skin: Skin is warm and dry. She is not diaphoretic.   Psychiatric: She has a normal mood and " affect. Her behavior is normal. Judgment and thought content normal.   Nursing note and vitals reviewed.    DIAGNOSTIC DATA:    Ct Angiogram Abdomen Pelvis With & Without Contrast    Result Date: 8/20/2018  Narrative:  History: 63-year-old with abdominal aortic aneurysm.  Reference: CTA abdomen/pelvis August 2017.  Technique Contrast-enhanced CT abdomen/pelvis was performed during arterial phase of contrast enhancement. Coronal and sagittal reformatted images provided. 3-D MIP reformatted images were obtained.  For this CT exam, one or more of the following dose reduction techniques was employed: -automated exposure control -mA and/or kVp adjustment for patient size -iterative reconstruction   mGy-cm  Findings  Chest Incidental scanning through the lower chest demonstrates a tiny subpleural benign-appearing nodule in the left lower lobe. This is stable from reference study.  Vascular findings There is diffuse aneurysmal dilatation of the entire abdominal aorta. Proximally at the level of the SMA origin the abdominal aortic diameter is 3.3 cm, unchanged. Greatest diameter of the abdominal aorta is noted distally just prior to the bifurcation with cross-sectional dimensions 4.5 x 3.3 cm (this 4.5 cm greatest dimension is likely overestimated due to obliquity of measurement). This is also unchanged using similar measurement techniques. There is marked mural thrombus throughout. The patent lumen channel is reduced to 18 mm in one area.  Atherosclerotic plaquing of the imaged iliofemoral arteries. There is a moderate/severe stenosis of the proximal right common iliac artery. There is chronic occlusion of the main right renal artery at its origin. There is an accessory right renal artery supplying the superior third right kidney. There is subsequent marked chronic ischemia and atrophy of the inferior two thirds right kidney, grossly unchanged. There is compensatory hypertrophy of the left kidney.  The common  hepatic artery arises directly from the abdominal aorta, variant. SMA is widely patent.  Additional findings Fatty liver with a small flash fill hemangioma right hepatic lobe. Cholelithiasis. Spleen unremarkable. No pancreatic or adrenal abnormality. No retroperitoneal adenopathy.  In the pelvis, urinary bladder is nondistended. No pelvic mass, free fluid, or lymphadenopathy. Postoperative changes of the pelvic sidewalls related to hysterectomy.  Demineralization.       Impression: 1. Aneurysmal dilatation of the abdominal aorta as described. No significant change from August 2017 using similar measurement techniques. 2. 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 This report was finalized on 08/20/2018 08:55 by Dr Norberto Cain, .      Patient Active Problem List   Diagnosis   • H/O vulvar dysplasia   • History of cervical cancer   • Tobacco abuse   • Hypertension   • Diabetes mellitus (CMS/HCC)   • Aneurysm of infrarenal abdominal aorta (CMS/HCC)         ICD-10-CM ICD-9-CM   1. Aneurysm of infrarenal abdominal aorta (CMS/HCC) I71.4 441.4   2. Essential hypertension I10 401.9   3. Tobacco abuse Z72.0 305.1       PLAN: After thoroughly evaluating Robert Daniels, I believe the best course of action is to remain conservative from a vascular standpoint. Her testing appears unchanged and still measures 4.5 cm in greatest dimension.  We will see Robert Daniels back in 6 months with repeat noninvasive testing for continued surveillance.  We did discuss smoking being the number one cause of growth.  We also discussed vascular risk factors as they pertain to progression of vascular disease including controlling her hypertension, diabetes mellitus, and smoking cessation.  I did  extensively on smoking cessation, and the patient was advised of the continued risks of smoking.  I provided over 10 minutes counseling on this matter. Body mass index is 22.42 kg/m². The patient is to continue taking their  medications as previously discussed.   This was all discussed in full with complete understanding.  Thank you for allowing me to participate in the care of your patient.  Please do not hesitate to call with any questions or concerns.  We will keep you aware of any further encounters with Robert Daniels.      Sincerely Yours,      JASON Palumbo

## 2018-09-05 NOTE — PATIENT INSTRUCTIONS
Steps to Quit Smoking  Smoking tobacco can be harmful to your health and can affect almost every organ in your body. Smoking puts you, and those around you, at risk for developing many serious chronic diseases. Quitting smoking is difficult, but it is one of the best things that you can do for your health. It is never too late to quit.  What are the benefits of quitting smoking?  When you quit smoking, you lower your risk of developing serious diseases and conditions, such as:  · Lung cancer or lung disease, such as COPD.  · Heart disease.  · Stroke.  · Heart attack.  · Infertility.  · Osteoporosis and bone fractures.    Additionally, symptoms such as coughing, wheezing, and shortness of breath may get better when you quit. You may also find that you get sick less often because your body is stronger at fighting off colds and infections. If you are pregnant, quitting smoking can help to reduce your chances of having a baby of low birth weight.  How do I get ready to quit?  When you decide to quit smoking, create a plan to make sure that you are successful. Before you quit:  · Pick a date to quit. Set a date within the next two weeks to give you time to prepare.  · Write down the reasons why you are quitting. Keep this list in places where you will see it often, such as on your bathroom mirror or in your car or wallet.  · Identify the people, places, things, and activities that make you want to smoke (triggers) and avoid them. Make sure to take these actions:  ? Throw away all cigarettes at home, at work, and in your car.  ? Throw away smoking accessories, such as ashtrays and lighters.  ? Clean your car and make sure to empty the ashtray.  ? Clean your home, including curtains and carpets.  · Tell your family, friends, and coworkers that you are quitting. Support from your loved ones can make quitting easier.  · Talk with your health care provider about your options for quitting smoking.  · Find out what treatment  options are covered by your health insurance.    What strategies can I use to quit smoking?  Talk with your healthcare provider about different strategies to quit smoking. Some strategies include:  · Quitting smoking altogether instead of gradually lessening how much you smoke over a period of time. Research shows that quitting “cold turkey” is more successful than gradually quitting.  · Attending in-person counseling to help you build problem-solving skills. You are more likely to have success in quitting if you attend several counseling sessions. Even short sessions of 10 minutes can be effective.  · Finding resources and support systems that can help you to quit smoking and remain smoke-free after you quit. These resources are most helpful when you use them often. They can include:  ? Online chats with a counselor.  ? Telephone quitlines.  ? Printed self-help materials.  ? Support groups or group counseling.  ? Text messaging programs.  ? Mobile phone applications.  · Taking medicines to help you quit smoking. (If you are pregnant or breastfeeding, talk with your health care provider first.) Some medicines contain nicotine and some do not. Both types of medicines help with cravings, but the medicines that include nicotine help to relieve withdrawal symptoms. Your health care provider may recommend:  ? Nicotine patches, gum, or lozenges.  ? Nicotine inhalers or sprays.  ? Non-nicotine medicine that is taken by mouth.    Talk with your health care provider about combining strategies, such as taking medicines while you are also receiving in-person counseling. Using these two strategies together makes you more likely to succeed in quitting than if you used either strategy on its own.  If you are pregnant or breastfeeding, talk with your health care provider about finding counseling or other support strategies to quit smoking. Do not take medicine to help you quit smoking unless told to do so by your health care  provider.  What things can I do to make it easier to quit?  Quitting smoking might feel overwhelming at first, but there is a lot that you can do to make it easier. Take these important actions:  · Reach out to your family and friends and ask that they support and encourage you during this time. Call telephone quitlines, reach out to support groups, or work with a counselor for support.  · Ask people who smoke to avoid smoking around you.  · Avoid places that trigger you to smoke, such as bars, parties, or smoke-break areas at work.  · Spend time around people who do not smoke.  · Lessen stress in your life, because stress can be a smoking trigger for some people. To lessen stress, try:  ? Exercising regularly.  ? Deep-breathing exercises.  ? Yoga.  ? Meditating.  ? Performing a body scan. This involves closing your eyes, scanning your body from head to toe, and noticing which parts of your body are particularly tense. Purposefully relax the muscles in those areas.  · Download or purchase mobile phone or tablet apps (applications) that can help you stick to your quit plan by providing reminders, tips, and encouragement. There are many free apps, such as QuitGuide from the CDC (Centers for Disease Control and Prevention). You can find other support for quitting smoking (smoking cessation) through smokefree.gov and other websites.    How will I feel when I quit smoking?  Within the first 24 hours of quitting smoking, you may start to feel some withdrawal symptoms. These symptoms are usually most noticeable 2-3 days after quitting, but they usually do not last beyond 2-3 weeks. Changes or symptoms that you might experience include:  · Mood swings.  · Restlessness, anxiety, or irritation.  · Difficulty concentrating.  · Dizziness.  · Strong cravings for sugary foods in addition to nicotine.  · Mild weight gain.  · Constipation.  · Nausea.  · Coughing or a sore throat.  · Changes in how your medicines work in your  body.  · A depressed mood.  · Difficulty sleeping (insomnia).    After the first 2-3 weeks of quitting, you may start to notice more positive results, such as:  · Improved sense of smell and taste.  · Decreased coughing and sore throat.  · Slower heart rate.  · Lower blood pressure.  · Clearer skin.  · The ability to breathe more easily.  · Fewer sick days.    Quitting smoking is very challenging for most people. Do not get discouraged if you are not successful the first time. Some people need to make many attempts to quit before they achieve long-term success. Do your best to stick to your quit plan, and talk with your health care provider if you have any questions or concerns.  This information is not intended to replace advice given to you by your health care provider. Make sure you discuss any questions you have with your health care provider.  Document Released: 12/12/2002 Document Revised: 08/15/2017 Document Reviewed: 05/03/2016  [employer] Interactive Patient Education © 2018 [employer]  Smoking Tobacco Information  Smoking tobacco will very likely harm your health. Tobacco contains a poisonous (toxic), colorless chemical called nicotine. Nicotine affects the brain and makes tobacco addictive. This change in your brain can make it hard to stop smoking. Tobacco also has other toxic chemicals that can hurt your body and raise your risk of many cancers.  How can smoking tobacco affect me?  Smoking tobacco can increase your chances of having serious health conditions, such as:  · Cancer. Smoking is most commonly associated with lung cancer, but can lead to cancer in other parts of the body.  · Chronic obstructive pulmonary disease (COPD). This is a long-term lung condition that makes it hard to breathe. It also gets worse over time.  · High blood pressure (hypertension), heart disease, stroke, or heart attack.  · Lung infections, such as pneumonia.  · Cataracts. This is when the lenses in the eyes become  clouded.  · Digestive problems. This may include peptic ulcers, heartburn, and gastroesophageal reflux disease (GERD).  · Oral health problems, such as gum disease and tooth loss.  · Loss of taste and smell.    Smoking can affect your appearance by causing:  · Wrinkles.  · Yellow or stained teeth, fingers, and fingernails.    Smoking tobacco can also affect your social life.  · Many workplaces, restaurants, hotels, and public places are tobacco-free. This means that you may experience challenges in finding places to smoke when away from home.  · The cost of a smoking habit can be expensive. Expenses for someone who smokes come in two ways:  ? You spend money on a regular basis to buy tobacco.  ? Your health care costs in the long-term are higher if you smoke.  · Tobacco smoke can also affect the health of those around you. Children of smokers have greater chances of:  ? Sudden infant death syndrome (SIDS).  ? Ear infections.  ? Lung infections.    What lifestyle changes can be made?  · Do not start smoking. Quit if you already do.  · To quit smoking:  ? Make a plan to quit smoking and commit yourself to it. Look for programs to help you and ask your health care provider for recommendations and ideas.  ? Talk with your health care provider about using nicotine replacement medicines to help you quit. Medicine replacement medicines include gum, lozenges, patches, sprays, or pills.  ? Do not replace cigarette smoking with electronic cigarettes, which are commonly called e-cigarettes. The safety of e-cigarettes is not known, and some may contain harmful chemicals.  ? Avoid places, people, or situations that tempt you to smoke.  ? If you try to quit but return to smoking, don't give up hope. It is very common for people to try a number of times before they fully succeed. When you feel ready again, give it another try.  · Quitting smoking might affect the way you eat as well as your weight. Be prepared to monitor your  eating habits. Get support in planning and following a healthy diet.  · Ask your health care provider about having regular tests (screenings) to check for cancer. This may include blood tests, imaging tests, and other tests.  · Exercise regularly. Consider taking walks, joining a gym, or doing yoga or exercise classes.  · Develop skills to manage your stress. These skills include meditation.  What are the benefits of quitting smoking?  By quitting smoking, you may:  · Lower your risk of getting cancer and other diseases caused by smoking.  · Live longer.  · Breathe better.  · Lower your blood pressure and heart rate.  · Stop your addiction to tobacco.  · Stop creating secondhand smoke that hurts other people.  · Improve your sense of taste and smell.  · Look better over time, due to having fewer wrinkles and less staining.    What can happen if changes are not made?  If you do not stop smoking, you may:  · Get cancer and other diseases.  · Develop COPD or other long-term (chronic) lung conditions.  · Develop serious problems with your heart and blood vessels (cardiovascular system).  · Need more tests to screen for problems caused by smoking.  · Have higher, long-term healthcare costs from medicines or treatments related to smoking.  · Continue to have worsening changes in your lungs, mouth, and nose.    Where to find support:  To get support to quit smoking, consider:  · Asking your health care provider for more information and resources.  · Taking classes to learn more about quitting smoking.  · Looking for local organizations that offer resources about quitting smoking.  · Joining a support group for people who want to quit smoking in your local community.    Where to find more information:  You may find more information about quitting smoking from:  · HelpGuide.org: www.helpguide.org/articles/addictions/how-to-quit-smoking.htm  · Smokefree.gov: smokefree.gov  · American Lung Association: www.lung.org    Contact  a health care provider if:  · You have problems breathing.  · Your lips, nose, or fingers turn blue.  · You have chest pain.  · You are coughing up blood.  · You feel faint or you pass out.  · You have other noticeable changes that cause you to worry.  Summary  · Smoking tobacco can negatively affect your health, the health of those around you, your finances, and your social life.  · Do not start smoking. Quit if you already do. If you need help quitting, ask your health care provider.  · Think about joining a support group for people who want to quit smoking in your local community. There are many effective programs that will help you to quit this behavior.  This information is not intended to replace advice given to you by your health care provider. Make sure you discuss any questions you have with your health care provider.  Document Released: 01/02/2018 Document Revised: 01/02/2018 Document Reviewed: 01/02/2018  [employer] Interactive Patient Education © 2018 [employer]

## 2018-11-09 ENCOUNTER — TELEPHONE (OUTPATIENT)
Dept: NEUROSURGERY | Age: 63
End: 2018-11-09

## 2018-11-12 ENCOUNTER — TELEPHONE (OUTPATIENT)
Dept: NEUROSURGERY | Age: 63
End: 2018-11-12

## 2018-11-15 ENCOUNTER — TELEPHONE (OUTPATIENT)
Dept: NEUROSURGERY | Age: 63
End: 2018-11-15

## 2018-11-26 ENCOUNTER — TELEPHONE (OUTPATIENT)
Dept: NEUROSURGERY | Age: 63
End: 2018-11-26

## 2019-01-04 ENCOUNTER — TELEPHONE (OUTPATIENT)
Dept: NEUROSURGERY | Age: 64
End: 2019-01-04

## 2019-02-04 ENCOUNTER — OFFICE VISIT (OUTPATIENT)
Dept: NEUROSURGERY | Age: 64
End: 2019-02-04
Payer: MEDICARE

## 2019-02-04 VITALS
SYSTOLIC BLOOD PRESSURE: 119 MMHG | WEIGHT: 144 LBS | BODY MASS INDEX: 23.14 KG/M2 | OXYGEN SATURATION: 94 % | HEIGHT: 66 IN | DIASTOLIC BLOOD PRESSURE: 73 MMHG | HEART RATE: 50 BPM

## 2019-02-04 DIAGNOSIS — M50.30 DDD (DEGENERATIVE DISC DISEASE), CERVICAL: ICD-10-CM

## 2019-02-04 DIAGNOSIS — M54.89 INTERSCAPULAR PAIN: ICD-10-CM

## 2019-02-04 DIAGNOSIS — M54.2 NECK PAIN: ICD-10-CM

## 2019-02-04 DIAGNOSIS — M54.42 CHRONIC MIDLINE LOW BACK PAIN WITH LEFT-SIDED SCIATICA: ICD-10-CM

## 2019-02-04 DIAGNOSIS — G95.20 CERVICAL SPINAL CORD COMPRESSION (HCC): Primary | ICD-10-CM

## 2019-02-04 DIAGNOSIS — G89.29 CHRONIC MIDLINE LOW BACK PAIN WITH LEFT-SIDED SCIATICA: ICD-10-CM

## 2019-02-04 DIAGNOSIS — M79.605 LEFT LEG PAIN: ICD-10-CM

## 2019-02-04 PROCEDURE — 4004F PT TOBACCO SCREEN RCVD TLK: CPT | Performed by: NURSE PRACTITIONER

## 2019-02-04 PROCEDURE — 1101F PT FALLS ASSESS-DOCD LE1/YR: CPT | Performed by: NURSE PRACTITIONER

## 2019-02-04 PROCEDURE — 4040F PNEUMOC VAC/ADMIN/RCVD: CPT | Performed by: NURSE PRACTITIONER

## 2019-02-04 PROCEDURE — 1090F PRES/ABSN URINE INCON ASSESS: CPT | Performed by: NURSE PRACTITIONER

## 2019-02-04 PROCEDURE — G8427 DOCREV CUR MEDS BY ELIG CLIN: HCPCS | Performed by: NURSE PRACTITIONER

## 2019-02-04 PROCEDURE — 3017F COLORECTAL CA SCREEN DOC REV: CPT | Performed by: NURSE PRACTITIONER

## 2019-02-04 PROCEDURE — G8420 CALC BMI NORM PARAMETERS: HCPCS | Performed by: NURSE PRACTITIONER

## 2019-02-04 PROCEDURE — 99204 OFFICE O/P NEW MOD 45 MIN: CPT | Performed by: NURSE PRACTITIONER

## 2019-02-04 PROCEDURE — G8400 PT W/DXA NO RESULTS DOC: HCPCS | Performed by: NURSE PRACTITIONER

## 2019-02-04 PROCEDURE — 1123F ACP DISCUSS/DSCN MKR DOCD: CPT | Performed by: NURSE PRACTITIONER

## 2019-02-04 PROCEDURE — G8484 FLU IMMUNIZE NO ADMIN: HCPCS | Performed by: NURSE PRACTITIONER

## 2019-02-04 RX ORDER — CITALOPRAM 40 MG/1
40 TABLET ORAL
COMMUNITY

## 2019-02-04 RX ORDER — ALBUTEROL SULFATE 90 UG/1
2 AEROSOL, METERED RESPIRATORY (INHALATION)
COMMUNITY

## 2019-02-04 RX ORDER — SIMVASTATIN 20 MG
TABLET ORAL
COMMUNITY

## 2019-02-04 ASSESSMENT — ENCOUNTER SYMPTOMS
BACK PAIN: 1
COUGH: 1
EYES NEGATIVE: 1
GASTROINTESTINAL NEGATIVE: 1
SHORTNESS OF BREATH: 1

## 2019-02-28 ENCOUNTER — TELEPHONE (OUTPATIENT)
Dept: NEUROSURGERY | Age: 64
End: 2019-02-28

## 2019-03-01 ENCOUNTER — TELEPHONE (OUTPATIENT)
Dept: NEUROSURGERY | Age: 64
End: 2019-03-01

## 2019-03-04 ENCOUNTER — TELEPHONE (OUTPATIENT)
Dept: NEUROSURGERY | Age: 64
End: 2019-03-04

## 2019-03-11 ENCOUNTER — HOSPITAL ENCOUNTER (OUTPATIENT)
Dept: CT IMAGING | Facility: HOSPITAL | Age: 64
Discharge: HOME | End: 2019-03-11
Admitting: NURSE PRACTITIONER

## 2019-03-11 ENCOUNTER — OFFICE VISIT (OUTPATIENT)
Dept: VASCULAR SURGERY | Facility: CLINIC | Age: 64
End: 2019-03-11

## 2019-03-11 VITALS
SYSTOLIC BLOOD PRESSURE: 118 MMHG | OXYGEN SATURATION: 95 % | WEIGHT: 144 LBS | DIASTOLIC BLOOD PRESSURE: 72 MMHG | BODY MASS INDEX: 22.6 KG/M2 | HEART RATE: 55 BPM | HEIGHT: 67 IN

## 2019-03-11 DIAGNOSIS — I71.43 ANEURYSM OF INFRARENAL ABDOMINAL AORTA (HCC): Primary | ICD-10-CM

## 2019-03-11 DIAGNOSIS — I71.43 ANEURYSM OF INFRARENAL ABDOMINAL AORTA (HCC): ICD-10-CM

## 2019-03-11 DIAGNOSIS — I10 ESSENTIAL HYPERTENSION: ICD-10-CM

## 2019-03-11 DIAGNOSIS — Z72.0 TOBACCO ABUSE: ICD-10-CM

## 2019-03-11 LAB — CREAT BLDA-MCNC: 0.8 MG/DL (ref 0.6–1.3)

## 2019-03-11 PROCEDURE — 82565 ASSAY OF CREATININE: CPT

## 2019-03-11 PROCEDURE — 0 IOPAMIDOL PER 1 ML: Performed by: NURSE PRACTITIONER

## 2019-03-11 PROCEDURE — 99214 OFFICE O/P EST MOD 30 MIN: CPT | Performed by: NURSE PRACTITIONER

## 2019-03-11 PROCEDURE — 74174 CTA ABD&PLVS W/CONTRAST: CPT

## 2019-03-11 RX ADMIN — IOPAMIDOL 150 ML: 755 INJECTION, SOLUTION INTRAVENOUS at 08:02

## 2019-03-11 NOTE — PROGRESS NOTES
"3/11/2019       Titi Bullock MD  [address]  [place] [address]        Robert Daniels  1955    Chief Complaint   Patient presents with   • Follow-up     6 Month Follow UP For Aneurysm of Infrarenal Abdominal Aorta. Test 03/11/2019 CT pad angiogram abd pelvis w wo. Patient denies any stroke like symptoms.        Dear Titi Bullock MD:    HPI     I had the pleasure of seeing you patient in the office today for follow up.  As you recall, the patient is a 64 y.o. female who we recently saw for an abdominal aortic aneurysm.  She has a history of back problems and was having pain down both legs.  She had an incidental finding of a saccular 1.7 cm aneurysm found on a noncontrast CT.  She does smoke about 3/4 pack of cigarettes per day.  She denies any family history of aneurysms.    She did have noninvasive testing performed today, which I did review in office.     Review of Systems   Constitutional: Negative.    HENT: Negative.    Eyes: Negative.    Respiratory: Negative.    Cardiovascular: Negative.    Gastrointestinal: Negative.    Endocrine: Negative.    Genitourinary: Negative.    Musculoskeletal: Negative.    Skin: Negative.    Allergic/Immunologic: Negative.    Neurological: Negative.  Negative for dizziness.   Hematological: Negative.    Psychiatric/Behavioral: Negative.        /72 (BP Location: Right arm, Patient Position: Sitting, Cuff Size: Adult)   Pulse 55   Ht 168.9 cm (66.5\")   Wt 65.3 kg (144 lb)   SpO2 95%   BMI 22.89 kg/m²   Physical Exam   Constitutional: She is oriented to person, place, and time. She appears well-developed and well-nourished. No distress.   HENT:   Head: Normocephalic and atraumatic.   Mouth/Throat: Oropharynx is clear and moist.   Eyes: Pupils are equal, round, and reactive to light. No scleral icterus.   Neck: Normal range of motion. Neck supple. No JVD present. Carotid bruit is not present. No thyromegaly present.   Cardiovascular: Normal rate, " regular rhythm, S2 normal, normal heart sounds, intact distal pulses and normal pulses. Exam reveals no gallop and no friction rub.   No murmur heard.  Pulmonary/Chest: Effort normal and breath sounds normal.   Abdominal: Soft. Normal appearance and bowel sounds are normal. She exhibits pulsatile midline mass. There is no hepatosplenomegaly. There is no tenderness.   Musculoskeletal: Normal range of motion.   Neurological: She is alert and oriented to person, place, and time. No cranial nerve deficit.   Skin: Skin is warm and dry. She is not diaphoretic.   Psychiatric: She has a normal mood and affect. Her behavior is normal. Judgment and thought content normal.   Nursing note and vitals reviewed.    DIAGNOSTIC DATA:    Ct Angiogram Abdomen Pelvis With & Without Contrast    Result Date: 3/11/2019  Narrative: CT ANGIOGRAM ABDOMEN PELVIS W WO CONTRAST- 3/11/2019 7:59 AM CDT  HISTORY: Abdominal aortic aneurysm (AAA), known, follow up; I71.4-Abdominal aortic aneurysm, without rupture  COMPARISON: 8/20/2018  DOSE LENGTH PRODUCT: 440 mGy cm. Automated exposure control was also utilized to decrease patient radiation dose.  TECHNIQUE: Axial images of the abdomen and pelvis are obtained following IV contrast. 2-D and maximal intensity projection images are reconstructed and reviewed.  FINDINGS:  There is stable aneurysmal dilatation of the infrarenal abdominal aorta. The aneurysm measures 4.5 x by 3.2 cm in greatest diameter. Small focal saccular component is seen above the origin of the inferior mesenteric artery. Common iliac arteries remain normal in caliber measuring 1.2 cm. There is diffuse atherosclerotic changes. There is a separate origin of the common hepatic and splenic arteries from the aorta is a normal variant. Superior mesenteric and left renal arteries appear patent with mild stenosis of the proximal left renal artery. There is chronic occlusion of the right renal renal artery with small patent accessory renal  artery supplying the superior pole of the right kidney. There is prominent right renal atrophy.. Inferior mesenteric artery is identified but small in caliber.  There is no suspicious focal liver or splenic mass. A enhancing focus within the right hepatic lobe on image 69 is similar may be an incidental hemangioma versus focal nodular hyperplasia. No pancreatic cyst or mass is identified. Gallbladder and adrenal glands are unremarkable. There is no free intraperitoneal air or loculated abscess. There is a normal appendix. Stomach is underdistended. No pathologic lymphadenopathy is visualized.  Visible lung bases are unremarkable.  There is osteopenia and degenerative change of the regional skeleton. Arthritic changes of the hips are more prominent on the right compared to the left.      Impression: 1. Stable appearing infrarenal abdominal aortic aneurysm with maximum measurements of 4.5 x 3.2 cm. No dissection. No evidence for aneurysm leak. 2. Atherosclerotic changes. 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 Patent accessory right renal artery supplying the superior pole of the right kidney. This report was finalized on 03/11/2019 08:31 by Dr. Roxanne Atwood MD.      Patient Active Problem List   Diagnosis   • H/O vulvar dysplasia   • History of cervical cancer   • Tobacco abuse   • Hypertension   • Diabetes mellitus (CMS/HCC)   • Aneurysm of infrarenal abdominal aorta (CMS/HCC)         ICD-10-CM ICD-9-CM   1. Aneurysm of infrarenal abdominal aorta (CMS/HCC) I71.4 441.4   2. Essential hypertension I10 401.9   3. Tobacco abuse Z72.0 305.1       PLAN: After thoroughly evaluating Robert Daniels, I believe the best course of action is to remain conservative from a vascular standpoint.  I did review her CTA of the abdomen pelvis and appears unchanged and still measures 4.5 cm in greatest dimension.  We will see Robert Daniels back in 6 months with repeat noninvasive testing for continued  surveillance, including a CTA of the abdomen and pelvis..  We did discuss smoking being the number one cause of growth.  We also discussed vascular risk factors as they pertain to progression of vascular disease including controlling her hypertension, diabetes mellitus, and smoking cessation.  I did  extensively on smoking cessation, and the patient was advised of the continued risks of smoking.  I provided over 10 minutes counseling on this matter. Body mass index is 22.89 kg/m². The patient is to continue taking their medications as previously discussed.   This was all discussed in full with complete understanding.  Thank you for allowing me to participate in the care of your patient.  Please do not hesitate to call with any questions or concerns.  We will keep you aware of any further encounters with Robert Daniels.      Sincerely Yours,      JASON Palumbo

## 2019-03-19 ENCOUNTER — OFFICE VISIT (OUTPATIENT)
Dept: NEUROSURGERY | Age: 64
End: 2019-03-19
Payer: MEDICARE

## 2019-03-19 VITALS
WEIGHT: 146 LBS | OXYGEN SATURATION: 94 % | BODY MASS INDEX: 23.46 KG/M2 | HEART RATE: 64 BPM | DIASTOLIC BLOOD PRESSURE: 79 MMHG | SYSTOLIC BLOOD PRESSURE: 140 MMHG | HEIGHT: 66 IN

## 2019-03-19 DIAGNOSIS — M48.061 SPINAL STENOSIS OF LUMBAR REGION WITHOUT NEUROGENIC CLAUDICATION: Primary | ICD-10-CM

## 2019-03-19 DIAGNOSIS — G95.20 CERVICAL SPINAL CORD COMPRESSION (HCC): ICD-10-CM

## 2019-03-19 DIAGNOSIS — G89.29 CHRONIC MIDLINE LOW BACK PAIN WITH LEFT-SIDED SCIATICA: ICD-10-CM

## 2019-03-19 DIAGNOSIS — M54.89 INTERSCAPULAR PAIN: ICD-10-CM

## 2019-03-19 DIAGNOSIS — M50.30 DDD (DEGENERATIVE DISC DISEASE), CERVICAL: ICD-10-CM

## 2019-03-19 DIAGNOSIS — M54.2 NECK PAIN: ICD-10-CM

## 2019-03-19 DIAGNOSIS — M51.26 LUMBAR DISC HERNIATION: ICD-10-CM

## 2019-03-19 DIAGNOSIS — M54.42 CHRONIC MIDLINE LOW BACK PAIN WITH LEFT-SIDED SCIATICA: ICD-10-CM

## 2019-03-19 DIAGNOSIS — M51.36 DDD (DEGENERATIVE DISC DISEASE), LUMBAR: ICD-10-CM

## 2019-03-19 DIAGNOSIS — M79.605 LEFT LEG PAIN: ICD-10-CM

## 2019-03-19 PROCEDURE — 1090F PRES/ABSN URINE INCON ASSESS: CPT | Performed by: NEUROLOGICAL SURGERY

## 2019-03-19 PROCEDURE — G8400 PT W/DXA NO RESULTS DOC: HCPCS | Performed by: NEUROLOGICAL SURGERY

## 2019-03-19 PROCEDURE — G8427 DOCREV CUR MEDS BY ELIG CLIN: HCPCS | Performed by: NEUROLOGICAL SURGERY

## 2019-03-19 PROCEDURE — 1101F PT FALLS ASSESS-DOCD LE1/YR: CPT | Performed by: NEUROLOGICAL SURGERY

## 2019-03-19 PROCEDURE — 4004F PT TOBACCO SCREEN RCVD TLK: CPT | Performed by: NEUROLOGICAL SURGERY

## 2019-03-19 PROCEDURE — 99213 OFFICE O/P EST LOW 20 MIN: CPT | Performed by: NEUROLOGICAL SURGERY

## 2019-03-19 PROCEDURE — 1123F ACP DISCUSS/DSCN MKR DOCD: CPT | Performed by: NEUROLOGICAL SURGERY

## 2019-03-19 PROCEDURE — 3017F COLORECTAL CA SCREEN DOC REV: CPT | Performed by: NEUROLOGICAL SURGERY

## 2019-03-19 PROCEDURE — G8420 CALC BMI NORM PARAMETERS: HCPCS | Performed by: NEUROLOGICAL SURGERY

## 2019-03-19 PROCEDURE — 4040F PNEUMOC VAC/ADMIN/RCVD: CPT | Performed by: NEUROLOGICAL SURGERY

## 2019-03-19 PROCEDURE — G8484 FLU IMMUNIZE NO ADMIN: HCPCS | Performed by: NEUROLOGICAL SURGERY

## 2019-06-22 ENCOUNTER — RESULTS ENCOUNTER (OUTPATIENT)
Dept: UROLOGY | Facility: CLINIC | Age: 64
End: 2019-06-22

## 2019-06-22 DIAGNOSIS — N26.1 ATROPHIC KIDNEY: ICD-10-CM

## 2019-06-24 DIAGNOSIS — N26.1 ATROPHIC KIDNEY: Primary | ICD-10-CM

## 2019-06-26 DIAGNOSIS — N26.1 ATROPHIC KIDNEY: Primary | ICD-10-CM

## 2019-08-09 ENCOUNTER — APPOINTMENT (OUTPATIENT)
Dept: NUCLEAR MEDICINE | Facility: HOSPITAL | Age: 64
End: 2019-08-09

## 2019-09-09 ENCOUNTER — TELEPHONE (OUTPATIENT)
Dept: VASCULAR SURGERY | Facility: CLINIC | Age: 64
End: 2019-09-09

## 2019-09-09 NOTE — TELEPHONE ENCOUNTER
Left message reminding Ms Daniels of her appointments for Tuesday, September 10th, 2019. Reminded Ms Daniels to arrive at the Main Entrance at 8 am with nothing to eat or drink 6 hours prior to testing and follow up afterwards at 945 am with Dr Ruiz. Also advised if she had any questions or needed to reschedule to please call the office at [phone].

## 2019-09-10 ENCOUNTER — HOSPITAL ENCOUNTER (OUTPATIENT)
Dept: CT IMAGING | Facility: HOSPITAL | Age: 64
Discharge: HOME | End: 2019-09-10
Admitting: NURSE PRACTITIONER

## 2019-09-10 ENCOUNTER — OFFICE VISIT (OUTPATIENT)
Dept: VASCULAR SURGERY | Facility: CLINIC | Age: 64
End: 2019-09-10

## 2019-09-10 VITALS
WEIGHT: 147 LBS | DIASTOLIC BLOOD PRESSURE: 72 MMHG | OXYGEN SATURATION: 97 % | HEART RATE: 54 BPM | BODY MASS INDEX: 23.07 KG/M2 | SYSTOLIC BLOOD PRESSURE: 136 MMHG | HEIGHT: 67 IN

## 2019-09-10 DIAGNOSIS — I71.40 ABDOMINAL AORTIC ANEURYSM (AAA) WITHOUT RUPTURE (HCC): Primary | ICD-10-CM

## 2019-09-10 DIAGNOSIS — Z72.0 TOBACCO ABUSE: ICD-10-CM

## 2019-09-10 DIAGNOSIS — I71.43 ANEURYSM OF INFRARENAL ABDOMINAL AORTA (HCC): ICD-10-CM

## 2019-09-10 PROCEDURE — 0 IOPAMIDOL PER 1 ML: Performed by: NURSE PRACTITIONER

## 2019-09-10 PROCEDURE — 99214 OFFICE O/P EST MOD 30 MIN: CPT | Performed by: NURSE PRACTITIONER

## 2019-09-10 PROCEDURE — 99407 BEHAV CHNG SMOKING > 10 MIN: CPT | Performed by: NURSE PRACTITIONER

## 2019-09-10 PROCEDURE — 74174 CTA ABD&PLVS W/CONTRAST: CPT

## 2019-09-10 RX ORDER — MULTIPLE VITAMINS W/ MINERALS TAB 9MG-400MCG
1 TAB ORAL DAILY
COMMUNITY
End: 2019-10-16

## 2019-09-10 RX ORDER — BIOTIN 1000 MCG
TABLET,CHEWABLE ORAL DAILY
COMMUNITY
End: 2019-10-16

## 2019-09-10 RX ADMIN — IOPAMIDOL 125 ML: 755 INJECTION, SOLUTION INTRAVENOUS at 07:24

## 2019-09-10 NOTE — PROGRESS NOTES
Subjective    Ms. Daniels is 64 y.o. female    Chief Complaint: Atrophic kidney    History of Present Illness  Abnormal radiographic finding   This patient presents with a possible abnormal finding of the right kidneyon CT lumbar spine. This is a  new finding. This test was done 1 year) ago. Onset is sudden. This was done in context of evaluation for trauma. Symptoms include Back pain.   The following portions of the patient's history were reviewed and updated as appropriate: allergies, current medications, past family history, past medical history, past social history, past surgical history and problem list.    Review of Systems   Constitutional: Negative for chills and fever.   Gastrointestinal: Negative for abdominal pain, anal bleeding and blood in stool.   Genitourinary: Positive for frequency. Negative for decreased urine volume, difficulty urinating, dyspareunia, dysuria, enuresis, flank pain, genital sores, hematuria, menstrual problem, pelvic pain, urgency, vaginal bleeding, vaginal discharge and vaginal pain.         Current Outpatient Medications:   •  albuterol (PROVENTIL HFA;VENTOLIN HFA) 108 (90 Base) MCG/ACT inhaler, Inhale 2 puffs Every 6 (Six) Hours As Needed for Wheezing., Disp: , Rfl:   •  ALPRAZolam (XANAX) 1 MG tablet, Take 1 mg by mouth Daily., Disp: , Rfl:   •  amLODIPine (NORVASC) 10 MG tablet, Take 10 mg by mouth Daily., Disp: , Rfl:   •  aspirin 81 MG EC tablet, Take 81 mg by mouth Daily., Disp: , Rfl:   •  Biotin 1000 MCG chewable tablet, Chew Daily., Disp: , Rfl:   •  carvedilol (COREG) 25 MG tablet, Take 25 mg by mouth 2 (Two) Times a Day With Meals., Disp: , Rfl:   •  citalopram (CeleXA) 40 MG tablet, Take 40 mg by mouth Daily., Disp: , Rfl:   •  Diphenhydramine-APAP, sleep, (TYLENOL PM EXTRA STRENGTH PO), Take  by mouth Every Night., Disp: , Rfl:   •  KRILL OIL PO, Take 1 tablet/day by mouth Daily., Disp: , Rfl:   •  metFORMIN (GLUCOPHAGE) 500 MG tablet, Take 500 mg by mouth 2 (Two)  Times a Day With Meals., Disp: , Rfl:   •  Multiple Vitamin (DAILY VITAMIN PO), Take  by mouth Daily., Disp: , Rfl:   •  Multiple Vitamins-Minerals (MULTIVITAMIN WITH MINERALS) tablet tablet, Take 1 tablet by mouth Daily. Areds eye vitamins, Disp: , Rfl:   •  oxyCODONE-acetaminophen (PERCOCET) 7.5-325 MG per tablet, Take 1 tablet by mouth Every 6 (Six) Hours As Needed., Disp: , Rfl:   •  simvastatin (ZOCOR) 20 MG tablet, Daily., Disp: , Rfl:   No current facility-administered medications for this visit.     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  [facility]   • OOPHORECTOMY         Social History     Socioeconomic History   • Marital status:      Spouse name: Not on file   • Number of children: Not on file   • Years of education: Not on file   • Highest education level: Not on file   Tobacco Use   • Smoking status: Current Every Day Smoker     Packs/day: 1.00     Types: Cigarettes   • Smokeless tobacco: Never Used   • Tobacco comment: Cutting back but not ready to quit.   Substance and Sexual Activity   • Alcohol use: No   • Drug use: No   • Sexual activity: Defer     Birth control/protection: Surgical       Family History   Problem Relation Age of Onset   • Breast cancer Sister         Unknown   • Hypertension Mother    • Heart attack Brother    • Heart attack Brother    • Heart attack Brother    • Heart attack Brother    • Colon cancer Neg Hx    • Ovarian cancer Neg Hx        Objective    Temp 97.3 °F (36.3 °C)   Ht 167.6 cm  "(66\")   Wt 66.2 kg (146 lb)   Breastfeeding? No   BMI 23.57 kg/m²     Physical Exam   Constitutional: She is oriented to person, place, and time. She appears well-developed and well-nourished. No distress.   Pulmonary/Chest: Effort normal.   Abdominal: Soft. She exhibits no distension and no mass. There is no tenderness. There is no rebound and no guarding. No hernia.   Neurological: She is alert and oriented to person, place, and time.   Skin: Skin is warm and dry. She is not diaphoretic.   Psychiatric: She has a normal mood and affect.   Vitals reviewed.          Results for orders placed or performed in visit on 09/13/19   POC Urinalysis Dipstick, Multipro   Result Value Ref Range    Color Yellow Yellow, Straw, Dark Yellow, [place]    Clarity, UA Clear Clear    Glucose, UA Negative Negative, 1000 mg/dL (3+) mg/dL    Bilirubin Negative Negative    Ketones, UA Negative Negative    Specific Gravity  1.020 1.005 - 1.030    Blood, UA Negative Negative    pH, Urine 7.0 5.0 - 8.0    Protein,  mg/dL (A) Negative mg/dL    Urobilinogen, UA Normal Normal    Nitrite, UA Negative Negative    Leukocytes Trace (A) Negative         Assessment and Plan    Diagnoses and all orders for this visit:    Atrophic kidney  -     POC Urinalysis Dipstick, Multipro  -     Basic Metabolic Panel; Future  -     NM Renal With Flow & Function With Pharmacological Intervention; Future    Nephrolithiasis        Patient with a history of right atrophic kidney with 85% function of the left kidney.  Denies right flank pain or UTI.      Renal scan is stable patient asymptomatic follow-up in 1 year with repeat renal scan and BMP.      "

## 2019-09-10 NOTE — PROGRESS NOTES
"9/10/2019       Titi Bullock MD  [address]  [place] [address]        Robert Daniels  1955    Chief Complaint   Patient presents with   • Follow-up     6 month f/u of AAA with CTA of the abdomen/pelvis.  Pt denies any stroke like symptoms.       Dear Titi Bullock MD:    HPI     I had the pleasure of seeing you patient in the office today for follow up.  As you recall, the patient is a 64 y.o. female who we recently saw for an abdominal aortic aneurysm.  She has a history of back problems and was having pain down both legs.  She had an incidental finding of a saccular 1.7 cm aneurysm found on a noncontrast CT.  She does smoke about 3/4 pack of cigarettes per day.  She denies any family history of aneurysms.    She did have noninvasive testing performed today, which I did review in office.     Review of Systems   Constitutional: Negative.    HENT: Negative.    Eyes: Negative.    Respiratory: Negative.    Cardiovascular: Negative.    Gastrointestinal: Negative.    Endocrine: Negative.    Genitourinary: Negative.    Musculoskeletal: Negative.    Skin: Negative.    Allergic/Immunologic: Negative.    Neurological: Negative.  Negative for dizziness.   Hematological: Negative.    Psychiatric/Behavioral: Negative.        /72   Pulse 54   Ht 168.9 cm (66.5\")   Wt 66.7 kg (147 lb)   SpO2 97%   BMI 23.37 kg/m²   Physical Exam   Constitutional: She is oriented to person, place, and time. She appears well-developed and well-nourished. No distress.   HENT:   Head: Normocephalic and atraumatic.   Mouth/Throat: Oropharynx is clear and moist.   Eyes: Pupils are equal, round, and reactive to light. No scleral icterus.   Neck: Normal range of motion. Neck supple. No JVD present. Carotid bruit is not present. No thyromegaly present.   Cardiovascular: Normal rate, regular rhythm, S2 normal, normal heart sounds, intact distal pulses and normal pulses. Exam reveals no gallop and no friction rub.   No " murmur heard.  Pulmonary/Chest: Effort normal and breath sounds normal.   Abdominal: Soft. Normal appearance and bowel sounds are normal. She exhibits pulsatile midline mass. There is no hepatosplenomegaly. There is no tenderness.   Musculoskeletal: Normal range of motion.   Neurological: She is alert and oriented to person, place, and time. No cranial nerve deficit.   Skin: Skin is warm and dry. She is not diaphoretic.   Psychiatric: She has a normal mood and affect. Her behavior is normal. Judgment and thought content normal.   Nursing note and vitals reviewed.    DIAGNOSTIC DATA:    Ct Angiogram Abdomen Pelvis With & Without Contrast    Result Date: 9/10/2019  Narrative: CT ANGIOGRAM ABDOMEN PELVIS W WO CONTRAST- 9/10/2019 7:21 AM CDT  HISTORY: Abdominal aortic aneurysm (AAA), known, follow up; I71.4-Abdominal aortic aneurysm, without rupture  COMPARISON: None  DOSE LENGTH PRODUCT: 435 mGy cm. Automated exposure control was also utilized to decrease patient radiation dose.  TECHNIQUE: Helical tomographic images of the abdomen and pelvis utilizing angiographic protocol were obtained following the intravenous infusion of contrast. Multiplanar and 3 D reformatted images were provided for review.  FINDINGS:  Angiogram: At the level of the renal arteries, the aorta measures 4 x 3 cm (previously 4.2 x 3.1 cm). Aneurysmal dilation of the infrarenal abdominal aorta measures 4.7 x 3.2 cm (previously 4.6 x 2.9 cm). There is persistent atherosclerosis without stenosis or dissection.  Bilateral common iliac, external iliac, internal iliac, common femoral, and visualized superficial and deep femoral arteries demonstrate no aneurysm, dissection, stenosis or vessel cut off. Moderate atherosclerosis is present.  The splenic and common hepatic arteries arise from the aorta. There is no celiac trunk. No stenosis is identified. There is approximately 50% narrowing of the proximal superior mesenteric artery due to noncalcified  atherosclerotic plaque. The remainder of the SMA and its major branches are patent. The inferior mesenteric artery and its proximal branches are normal in appearance.  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 As a result, there is atrophy of the lower two thirds of the RIGHT kidney and significantly decreased perfusion to the lower two thirds of the RIGHT kidney. An accessory RIGHT renal artery supplies perfusion to the upper pole.  Other findings: Mild fatty liver disease is noted. Small stones are seen in the gallbladder.      Impression: 1. Stable bilobed aneurysm of the abdominal aorta. Moderate to marked atherosclerosis. 2. 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 An accessory RIGHT renal artery remains patent. This report was finalized on 09/10/2019 08:32 by Dr. Carmine Diaz MD.      Patient Active Problem List   Diagnosis   • H/O vulvar dysplasia   • History of cervical cancer   • Tobacco abuse   • Hypertension   • Diabetes mellitus (CMS/HCC)   • Aneurysm of infrarenal abdominal aorta (CMS/HCC)         ICD-10-CM ICD-9-CM   1. Abdominal aortic aneurysm (AAA) without rupture (CMS/HCC) I71.4 441.4   2. Tobacco abuse Z72.0 305.1       PLAN: After thoroughly evaluating Robert Daniels, I believe the best course of action is to proceed with endovascular abdominal aortic aneurysm repair.  Her aneurysm is 4.7 cm.  Her aneurysm is saccular, which is concerning.  Risks of abdominal aortic aneurysm repair include, but are not limited to, bleeding, infection, vessel rupture, MI, stroke, and damage to kidney or bowel.  We did discuss smoking being the number one cause of growth.  We also discussed vascular risk factors as they pertain to progression of vascular disease including controlling her hypertension, diabetes mellitus, and smoking cessation.  I did  extensively on smoking cessation, and the patient was advised of the  continued risks of smoking.  I provided over 10 minutes counseling on this matter. Body mass index is 23.37 kg/m². The patient is to continue taking their medications as previously discussed.   This was all discussed in full with complete understanding.  Thank you for allowing me to participate in the care of your patient.  Please do not hesitate to call with any questions or concerns.  We will keep you aware of any further encounters with Robert Daniels.      Sincerely Yours,      JASON Palumbo

## 2019-09-11 ENCOUNTER — TELEPHONE (OUTPATIENT)
Dept: UROLOGY | Facility: CLINIC | Age: 64
End: 2019-09-11

## 2019-09-11 NOTE — TELEPHONE ENCOUNTER
Called pt to remind her to have a renal scan done before her appt she said she would have to call me back.

## 2019-09-13 ENCOUNTER — OFFICE VISIT (OUTPATIENT)
Dept: UROLOGY | Facility: CLINIC | Age: 64
End: 2019-09-13

## 2019-09-13 ENCOUNTER — HOSPITAL ENCOUNTER (OUTPATIENT)
Dept: NUCLEAR MEDICINE | Facility: HOSPITAL | Age: 64
Discharge: HOME | End: 2019-09-13

## 2019-09-13 VITALS — BODY MASS INDEX: 23.46 KG/M2 | TEMPERATURE: 97.3 F | WEIGHT: 146 LBS | HEIGHT: 66 IN

## 2019-09-13 DIAGNOSIS — N26.1 ATROPHIC KIDNEY: Primary | ICD-10-CM

## 2019-09-13 DIAGNOSIS — N26.1 ATROPHIC KIDNEY: ICD-10-CM

## 2019-09-13 DIAGNOSIS — N20.0 NEPHROLITHIASIS: ICD-10-CM

## 2019-09-13 LAB
BILIRUB BLD-MCNC: NEGATIVE MG/DL
CLARITY, POC: CLEAR
COLOR UR: YELLOW
GLUCOSE UR STRIP-MCNC: NEGATIVE MG/DL
KETONES UR QL: NEGATIVE
LEUKOCYTE EST, POC: ABNORMAL
NITRITE UR-MCNC: NEGATIVE MG/ML
PH UR: 7 [PH] (ref 5–8)
PROT UR STRIP-MCNC: ABNORMAL MG/DL
RBC # UR STRIP: NEGATIVE /UL
SP GR UR: 1.02 (ref 1–1.03)
UROBILINOGEN UR QL: NORMAL

## 2019-09-13 PROCEDURE — 99213 OFFICE O/P EST LOW 20 MIN: CPT | Performed by: UROLOGY

## 2019-09-13 PROCEDURE — 0 TECHNETIUM MERTIATIDE: Performed by: UROLOGY

## 2019-09-13 PROCEDURE — A9562 TC99M MERTIATIDE: HCPCS | Performed by: UROLOGY

## 2019-09-13 PROCEDURE — 78725 KIDNEY FUNCTION STUDY: CPT

## 2019-09-13 PROCEDURE — 81001 URINALYSIS AUTO W/SCOPE: CPT | Performed by: UROLOGY

## 2019-09-13 RX ADMIN — TECHNESCAN TC 99M MERTIATIDE 1 DOSE: 1 INJECTION, POWDER, LYOPHILIZED, FOR SOLUTION INTRAVENOUS at 08:20

## 2019-09-24 ENCOUNTER — OFFICE VISIT (OUTPATIENT)
Dept: CARDIOLOGY | Facility: CLINIC | Age: 64
End: 2019-09-24

## 2019-09-24 VITALS
BODY MASS INDEX: 22.91 KG/M2 | DIASTOLIC BLOOD PRESSURE: 62 MMHG | WEIGHT: 146 LBS | HEART RATE: 55 BPM | HEIGHT: 67 IN | SYSTOLIC BLOOD PRESSURE: 122 MMHG

## 2019-09-24 DIAGNOSIS — E11.9 TYPE 2 DIABETES MELLITUS WITHOUT COMPLICATION, WITHOUT LONG-TERM CURRENT USE OF INSULIN (HCC): ICD-10-CM

## 2019-09-24 DIAGNOSIS — Z01.818 PRE-OP EVALUATION: Primary | ICD-10-CM

## 2019-09-24 DIAGNOSIS — I71.43 ANEURYSM OF INFRARENAL ABDOMINAL AORTA (HCC): ICD-10-CM

## 2019-09-24 DIAGNOSIS — I10 ESSENTIAL HYPERTENSION: ICD-10-CM

## 2019-09-24 DIAGNOSIS — Z72.0 TOBACCO ABUSE: ICD-10-CM

## 2019-09-24 DIAGNOSIS — J41.8 MIXED SIMPLE AND MUCOPURULENT CHRONIC BRONCHITIS (HCC): ICD-10-CM

## 2019-09-24 PROBLEM — J44.9 COPD (CHRONIC OBSTRUCTIVE PULMONARY DISEASE) (HCC): Status: ACTIVE | Noted: 2019-09-24

## 2019-09-24 PROCEDURE — 99203 OFFICE O/P NEW LOW 30 MIN: CPT | Performed by: INTERNAL MEDICINE

## 2019-09-24 PROCEDURE — 93000 ELECTROCARDIOGRAM COMPLETE: CPT | Performed by: INTERNAL MEDICINE

## 2019-09-24 NOTE — PROGRESS NOTES
"Subjective    Colupist DOROTA Daniels is a 64 y.o. female. - Referred by [facility] for pre-op eval    History of Present Illness     PRE-OP EVAR (AAA) EVAL:  Has never had any heart problems. Is inactive and does not have cp. Is limited by WHITE. EKG is abn with sbrad, poor-R, nst today and no comparisons.    HTN:  Relates good control on current meds.    HLD:  Is on a potent statin and gets good reports from her pcp    TOBACCO ADDICTION:  Has been counseled on this and wants to quit. Can't take Chantix - \"it makes me sick\". Is considering 'Cold Turkey' and I have spent 5 mins today discussing NRT with her and she may pursue that.        The following portions of the patient's history were reviewed and updated as appropriate: allergies, current medications, past family history, past medical history, past social history, past surgical history and problem list.    Patient Active Problem List   Diagnosis   • H/O vulvar dysplasia   • History of cervical cancer   • Tobacco abuse   • Hypertension   • Diabetes mellitus (CMS/HCC)   • Aneurysm of infrarenal abdominal aorta (CMS/HCC)   • Pre-op evaluation   • COPD (chronic obstructive pulmonary disease) (CMS/HCC)       Allergies   Allergen Reactions   • Pravachol [Pravastatin Sodium] Myalgia       Family History   Problem Relation Age of Onset   • Breast cancer Sister         Unknown   • Hypertension Mother    • Heart disease Mother    • Cancer Mother    • Heart attack Brother    • Heart attack Brother    • Heart attack Brother    • Heart attack Brother    • Colon cancer Neg Hx    • Ovarian cancer Neg Hx        Social History     Socioeconomic History   • Marital status:      Spouse name: Not on file   • Number of children: Not on file   • Years of education: Not on file   • Highest education level: Not on file   Tobacco Use   • Smoking status: Current Every Day Smoker     Packs/day: 1.00     Types: Cigarettes   • Smokeless tobacco: Never Used   • Tobacco comment: Cutting back but not " ready to quit.   Substance and Sexual Activity   • Alcohol use: No   • Drug use: No   • Sexual activity: Defer     Birth control/protection: Surgical         Current Outpatient Medications:   •  albuterol (PROVENTIL HFA;VENTOLIN HFA) 108 (90 Base) MCG/ACT inhaler, Inhale 2 puffs Every 6 (Six) Hours As Needed for Wheezing., Disp: , Rfl:   •  ALPRAZolam (XANAX) 1 MG tablet, Take 1 mg by mouth Daily., Disp: , Rfl:   •  amLODIPine (NORVASC) 10 MG tablet, Take 10 mg by mouth Daily., Disp: , Rfl:   •  aspirin 81 MG EC tablet, Take 81 mg by mouth Daily., Disp: , Rfl:   •  carvedilol (COREG) 25 MG tablet, Take 25 mg by mouth 2 (Two) Times a Day With Meals., Disp: , Rfl:   •  citalopram (CeleXA) 40 MG tablet, Take 20 mg by mouth Daily., Disp: , Rfl:   •  Diphenhydramine-APAP, sleep, (TYLENOL PM EXTRA STRENGTH PO), Take  by mouth Every Night., Disp: , Rfl:   •  KRILL OIL PO, Take 1 tablet/day by mouth Daily., Disp: , Rfl:   •  metFORMIN (GLUCOPHAGE) 500 MG tablet, Take 500 mg by mouth 2 (Two) Times a Day With Meals., Disp: , Rfl:   •  oxyCODONE-acetaminophen (PERCOCET) 7.5-325 MG per tablet, Take 1 tablet by mouth Every 6 (Six) Hours As Needed., Disp: , Rfl:   •  simvastatin (ZOCOR) 20 MG tablet, Take 40 mg by mouth Every Night., Disp: , Rfl:   •  Biotin 1000 MCG chewable tablet, Chew Daily., Disp: , Rfl:   •  Multiple Vitamin (DAILY VITAMIN PO), Take  by mouth Daily., Disp: , Rfl:   •  Multiple Vitamins-Minerals (MULTIVITAMIN WITH MINERALS) tablet tablet, Take 1 tablet by mouth Daily. Areds eye vitamins, Disp: , Rfl:     Past Surgical History:   Procedure Laterality Date   •  SECTION     • HYSTERECTOMY      Due to cancer.  [facility]   • OOPHORECTOMY         Review of Systems   Constitutional: Negative for fatigue, fever and unexpected weight change.   HENT: Negative for congestion.    Eyes: Negative for visual disturbance.   Respiratory: Positive for cough and shortness of breath. Negative for apnea and wheezing.   "       Chronic sputum production   Cardiovascular: Negative for chest pain, palpitations and leg swelling.   Gastrointestinal: Negative for abdominal pain and vomiting.   Endocrine: Negative for cold intolerance and heat intolerance.   Genitourinary: Negative for difficulty urinating.   Musculoskeletal: Positive for back pain. Negative for myalgias.   Skin: Negative for rash.   Neurological: Negative for syncope.   Hematological: Does not bruise/bleed easily.   Psychiatric/Behavioral: Negative for sleep disturbance.       /62   Pulse 55   Ht 168.9 cm (66.5\")   Wt 66.2 kg (146 lb)   BMI 23.21 kg/m²   Procedures    Objective   Physical Exam   Constitutional: She is oriented to person, place, and time. She appears well-developed and well-nourished.   HENT:   Head: Normocephalic.   Eyes: Pupils are equal, round, and reactive to light.   Neck: No thyromegaly present.   Cardiovascular: Normal rate, regular rhythm, normal heart sounds and intact distal pulses. Exam reveals no gallop and no friction rub.   No murmur heard.  Pulmonary/Chest: Effort normal. No stridor. No respiratory distress. She has no wheezes. She has no rales.   Severely diminished bs bilat   Abdominal: Soft. Bowel sounds are normal. She exhibits no distension. There is no tenderness. There is no guarding.   Musculoskeletal: She exhibits no edema, tenderness or deformity.   Neurological: She is alert and oriented to person, place, and time.   Skin: Skin is warm and dry.   Psychiatric: She has a normal mood and affect.       Assessment/Plan   Colupist was seen today for surgery clearance.    Diagnoses and all orders for this visit:    Pre-op evaluation  Comments:  low risk of MACCE with the planned procedure  Orders:  -     ECG 12 Lead    Aneurysm of infrarenal abdominal aorta (CMS/HCC)    Essential hypertension  Comments:  controlled    Type 2 diabetes mellitus without complication, without long-term current use of insulin (CMS/HCC)  Comments:  on " po med    Tobacco abuse  Comments:  counseling by me today for 5 mins    Mixed simple and mucopurulent chronic bronchitis (CMS/HCC)  Comments:  presumptive dx - cause of SOA - rec eval by Pulm med                 Return if symptoms worsen or fail to improve.  Orders Placed This Encounter   Procedures   • ECG 12 Lead     Order Specific Question:   Reason for Exam:     Answer:   AAA REPAIR CLEARANCE.

## 2019-09-26 ENCOUNTER — PREP FOR SURGERY (OUTPATIENT)
Dept: OTHER | Facility: HOSPITAL | Age: 64
End: 2019-09-26

## 2019-09-26 DIAGNOSIS — Z51.81 ENCOUNTER FOR MONITORING ANTIPLATELET THERAPY: ICD-10-CM

## 2019-09-26 DIAGNOSIS — R06.02 SHORTNESS OF BREATH: Primary | ICD-10-CM

## 2019-09-26 DIAGNOSIS — I71.40 ABDOMINAL AORTIC ANEURYSM (AAA) WITHOUT RUPTURE (HCC): Primary | ICD-10-CM

## 2019-09-26 DIAGNOSIS — Z01.818 PREOP TESTING: ICD-10-CM

## 2019-09-26 DIAGNOSIS — Z79.02 ENCOUNTER FOR MONITORING ANTIPLATELET THERAPY: ICD-10-CM

## 2019-09-26 RX ORDER — BUPIVACAINE HCL/0.9 % NACL/PF 0.1 %
2 PLASTIC BAG, INJECTION (ML) EPIDURAL ONCE
Status: CANCELLED | OUTPATIENT
Start: 2019-09-26 | End: 2019-09-26

## 2019-09-26 NOTE — H&P
2019          Titi Bullock MD  [address]  [facility] [address]           Robert Daniels  1955          Chief Complaint   Patient presents with   • Follow-up       6 month f/u of AAA with CTA of the abdomen/pelvis.  Pt denies any stroke like symptoms.         Dear Titi Bullock MD:     HPI      I had the pleasure of seeing you patient in the office today for follow up.  As you recall, the patient is a 64 y.o. female who we recently saw for an abdominal aortic aneurysm.  She has a history of back problems and was having pain down both legs.  She had an incidental finding of a saccular 1.7 cm aneurysm found on a noncontrast CT.  She does smoke about 3/4 pack of cigarettes per day.  She denies any family history of aneurysms.    She did have noninvasive testing performed today, which I did review in office.     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  STEVEN BSO   • OOPHORECTOMY       Family History   Problem Relation Age of Onset   • Breast cancer Sister         Unknown   • Hypertension Mother    • Heart disease Mother    • Cancer Mother    • Heart attack Brother    • Heart attack Brother    • Heart attack Brother    • Heart attack Brother    • Colon cancer Neg Hx    • Ovarian cancer Neg Hx      Social History     Tobacco Use   • Smoking status: Current Every Day Smoker     Packs/day: 1.00     Types: Cigarettes   • Smokeless tobacco: Never Used   • Tobacco comment: Cutting back but not ready to quit.   Substance Use  Topics   • Alcohol use: No   • Drug use: No     Allergies   Allergen Reactions   • Pravachol [Pravastatin Sodium] Myalgia       Current Outpatient Medications:   •  albuterol (PROVENTIL HFA;VENTOLIN HFA) 108 (90 Base) MCG/ACT inhaler, Inhale 2 puffs Every 6 (Six) Hours As Needed for Wheezing., Disp: , Rfl:   •  ALPRAZolam (XANAX) 1 MG tablet, Take 1 mg by mouth Daily., Disp: , Rfl:   •  amLODIPine (NORVASC) 10 MG tablet, Take 10 mg by mouth Daily., Disp: , Rfl:   •  aspirin 81 MG EC tablet, Take 81 mg by mouth Daily., Disp: , Rfl:   •  Biotin 1000 MCG chewable tablet, Chew Daily., Disp: , Rfl:   •  carvedilol (COREG) 25 MG tablet, Take 25 mg by mouth 2 (Two) Times a Day With Meals., Disp: , Rfl:   •  citalopram (CeleXA) 40 MG tablet, Take 20 mg by mouth Daily., Disp: , Rfl:   •  Diphenhydramine-APAP, sleep, (TYLENOL PM EXTRA STRENGTH PO), Take  by mouth Every Night., Disp: , Rfl:   •  KRILL OIL PO, Take 1 tablet/day by mouth Daily., Disp: , Rfl:   •  metFORMIN (GLUCOPHAGE) 500 MG tablet, Take 500 mg by mouth 2 (Two) Times a Day With Meals., Disp: , Rfl:   •  Multiple Vitamin (DAILY VITAMIN PO), Take  by mouth Daily., Disp: , Rfl:   •  Multiple Vitamins-Minerals (MULTIVITAMIN WITH MINERALS) tablet tablet, Take 1 tablet by mouth Daily. [employer] eye vitamins, Disp: , Rfl:   •  oxyCODONE-acetaminophen (PERCOCET) 7.5-325 MG per tablet, Take 1 tablet by mouth Every 6 (Six) Hours As Needed., Disp: , Rfl:   •  simvastatin (ZOCOR) 20 MG tablet, Take 40 mg by mouth Every Night., Disp: , Rfl:        Review of Systems   Constitutional: Negative.    HENT: Negative.    Eyes: Negative.    Respiratory: Negative.    Cardiovascular: Negative.    Gastrointestinal: Negative.    Endocrine: Negative.    Genitourinary: Negative.    Musculoskeletal: Negative.    Skin: Negative.    Allergic/Immunologic: Negative.    Neurological: Negative.  Negative for dizziness.   Hematological: Negative.    Psychiatric/Behavioral: Negative.          BP  "136/72   Pulse 54   Ht 168.9 cm (66.5\")   Wt 66.7 kg (147 lb)   SpO2 97%   BMI 23.37 kg/m²   Physical Exam   Constitutional: She is oriented to person, place, and time. She appears well-developed and well-nourished. No distress.   HENT:   Head: Normocephalic and atraumatic.   Mouth/Throat: Oropharynx is clear and moist.   Eyes: Pupils are equal, round, and reactive to light. No scleral icterus.   Neck: Normal range of motion. Neck supple. No JVD present. Carotid bruit is not present. No thyromegaly present.   Cardiovascular: Normal rate, regular rhythm, S2 normal, normal heart sounds, intact distal pulses and normal pulses. Exam reveals no gallop and no friction rub.   No murmur heard.  Pulmonary/Chest: Effort normal and breath sounds normal.   Abdominal: Soft. Normal appearance and bowel sounds are normal. She exhibits pulsatile midline mass. There is no hepatosplenomegaly. There is no tenderness.   Musculoskeletal: Normal range of motion.   Neurological: She is alert and oriented to person, place, and time. No cranial nerve deficit.   Skin: Skin is warm and dry. She is not diaphoretic.   Psychiatric: She has a normal mood and affect. Her behavior is normal. Judgment and thought content normal.   Nursing note and vitals reviewed.     DIAGNOSTIC DATA:     Ct Angiogram Abdomen Pelvis With & Without Contrast     Result Date: 9/10/2019  Narrative: CT ANGIOGRAM ABDOMEN PELVIS W WO CONTRAST- 9/10/2019 7:21 AM CDT  HISTORY: Abdominal aortic aneurysm (AAA), known, follow up; I71.4-Abdominal aortic aneurysm, without rupture  COMPARISON: None  DOSE LENGTH PRODUCT: 435 mGy cm. Automated exposure control was also utilized to decrease patient radiation dose.  TECHNIQUE: Helical tomographic images of the abdomen and pelvis utilizing angiographic protocol were obtained following the intravenous infusion of contrast. Multiplanar and 3 D reformatted images were provided for review.  FINDINGS:  Angiogram: At the level of the " renal arteries, the aorta measures 4 x 3 cm (previously 4.2 x 3.1 cm). Aneurysmal dilation of the infrarenal abdominal aorta measures 4.7 x 3.2 cm (previously 4.6 x 2.9 cm). There is persistent atherosclerosis without stenosis or dissection.  Bilateral common iliac, external iliac, internal iliac, common femoral, and visualized superficial and deep femoral arteries demonstrate no aneurysm, dissection, stenosis or vessel cut off. Moderate atherosclerosis is present.  The splenic and common hepatic arteries arise from the aorta. There is no celiac trunk. No stenosis is identified. There is approximately 50% narrowing of the proximal superior mesenteric artery due to noncalcified atherosclerotic plaque. The remainder of the SMA and its major branches are patent. The inferior mesenteric artery and its proximal branches are normal in appearance.  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 As a result, there is atrophy of the lower two thirds of the RIGHT kidney and significantly decreased perfusion to the lower two thirds of the RIGHT kidney. An accessory RIGHT renal artery supplies perfusion to the upper pole.  Other findings: Mild fatty liver disease is noted. Small stones are seen in the gallbladder.       Impression: 1. Stable bilobed aneurysm of the abdominal aorta. Moderate to marked atherosclerosis. 2. 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 An accessory RIGHT renal artery remains patent. This report was finalized on 09/10/2019 08:32 by Dr. Carmine Diaz MD.            Patient Active Problem List   Diagnosis   • H/O vulvar dysplasia   • History of cervical cancer   • Tobacco abuse   • Hypertension   • Diabetes mellitus (CMS/HCC)   • Aneurysm of infrarenal abdominal aorta (CMS/HCC)             ICD-10-CM ICD-9-CM   1. Abdominal aortic aneurysm (AAA) without rupture (CMS/HCC) I71.4 441.4   2. Tobacco abuse Z72.0 305.1         PLAN:  After thoroughly evaluating Robert Daniels, I believe the best course of action is to proceed with endovascular abdominal aortic aneurysm repair.  Her aneurysm is 4.7 cm.  Her aneurysm is saccular, which is concerning.  Risks of abdominal aortic aneurysm repair include, but are not limited to, bleeding, infection, vessel rupture, MI, stroke, and damage to kidney or bowel.  We did discuss smoking being the number one cause of growth.  We also discussed vascular risk factors as they pertain to progression of vascular disease including controlling her hypertension, diabetes mellitus, and smoking cessation.  I did  extensively on smoking cessation, and the patient was advised of the continued risks of smoking.  I provided over 10 minutes counseling on this matter. Body mass index is 23.37 kg/m². The patient is to continue taking their medications as previously discussed.   This was all discussed in full with complete understanding.  Thank you for allowing me to participate in the care of your patient.  Please do not hesitate to call with any questions or concerns.  We will keep you aware of any further encounters with Robert Daniels.        Sincerely Yours,       JASON Palumbo

## 2019-09-27 ENCOUNTER — TELEPHONE (OUTPATIENT)
Dept: VASCULAR SURGERY | Facility: CLINIC | Age: 64
End: 2019-09-27

## 2019-09-27 ENCOUNTER — HOSPITAL ENCOUNTER (OUTPATIENT)
Facility: HOSPITAL | Age: 64
Setting detail: SURGERY ADMIT
End: 2019-09-27
Attending: SURGERY | Admitting: SURGERY

## 2019-09-27 PROBLEM — Z01.818 PREOP TESTING: Status: ACTIVE | Noted: 2019-09-27

## 2019-09-27 PROBLEM — I71.40 ABDOMINAL AORTIC ANEURYSM (AAA) WITHOUT RUPTURE (HCC): Status: ACTIVE | Noted: 2019-09-27

## 2019-09-27 NOTE — TELEPHONE ENCOUNTER
Spoke with Ms Daniels letting her know that Stacia GOMEZ had sent orders to our surgery scheduler Kate and she would be in contact with her to get everything scheduled for her procedure. I also advised her that Stacia GOMEZ did put a referral in to pulmonology so she could be evaluated but she wouldn't need it prior to Dr Ruiz's procedure. Ms Daniels verbalized understanding and stated she would wait to hear from Kate.          ----- Message from JASON Palumbo sent at 9/26/2019  4:46 PM CDT -----  I sent a message to Brandee with orders placed.  When she calls the patient I informed her to tell her Dr. Ruiz said we would not need pulmonology prior to scheduling procedure however I have placed a referral for her to be evaluated.  ----- Message -----  From: Elizabeth Garcia  Sent: 9/24/2019  10:46 AM  To: JASON Palumbo    Ms Daniels called stated she has seen Dr Guevara and he states her heart is all good but her lungs looked very weak and she need to see a lung specialist. Ms Daniels asked if we could get her an appointment to see someone about her lungs. If you will advise I will let the patient know. Thank you.

## 2019-09-27 NOTE — TELEPHONE ENCOUNTER
Left message for patient and advised of upcoming procedure.  Patient pre work is scheduled for 10/16/2019 at 945 am.  Patient procedure is scheduled for 10/23/2019 at 515 am.  Patient advised of location time and prep.  All information was mailed to address on file.

## 2019-10-03 ENCOUNTER — NURSE TRIAGE (OUTPATIENT)
Dept: CALL CENTER | Facility: HOSPITAL | Age: 64
End: 2019-10-03

## 2019-10-03 NOTE — TELEPHONE ENCOUNTER
"She is wanting to know how long how the recovery will be for her up coming surgery this week. Explained that she would need to call her surgeon. She states she will call the surgeon.     Reason for Disposition  • [1] Caller requesting NON-URGENT health information AND [2] PCP's office is the best resource    Additional Information  • Negative: [1] Caller is not with the adult (patient) AND [2] reporting urgent symptoms  • Negative: Lab result questions  • Negative: Medication questions  • Negative: Caller cannot be reached by phone  • Negative: Caller has already spoken to PCP or another triager  • Negative: RN needs further essential information from caller in order to complete triage  • Negative: Requesting regular office appointment  • Negative: General information question, no triage required and triager able to answer question  • Negative: Question about upcoming scheduled test, no triage required and triager able to answer question  • Negative: [1] Caller is not with the adult (patient) AND [2] probable NON-URGENT symptoms  • Negative: Health Information question, no triage required and triager able to answer question    Answer Assessment - Initial Assessment Questions  1. REASON FOR CALL or QUESTION: \"What is your reason for calling today?\" or \"How can I best help you?\" or \"What question do you have that I can help answer?\"      See note    Protocols used: INFORMATION ONLY CALL-ADULT-      "

## 2019-10-16 ENCOUNTER — APPOINTMENT (OUTPATIENT)
Dept: PREADMISSION TESTING | Facility: HOSPITAL | Age: 64
End: 2019-10-16

## 2019-10-16 VITALS
HEART RATE: 64 BPM | OXYGEN SATURATION: 96 % | DIASTOLIC BLOOD PRESSURE: 69 MMHG | BODY MASS INDEX: 23.81 KG/M2 | HEIGHT: 66 IN | SYSTOLIC BLOOD PRESSURE: 148 MMHG | WEIGHT: 148.15 LBS

## 2019-10-16 DIAGNOSIS — I71.40 ABDOMINAL AORTIC ANEURYSM (AAA) WITHOUT RUPTURE (HCC): ICD-10-CM

## 2019-10-16 DIAGNOSIS — Z79.02 ENCOUNTER FOR MONITORING ANTIPLATELET THERAPY: ICD-10-CM

## 2019-10-16 DIAGNOSIS — Z51.81 ENCOUNTER FOR MONITORING ANTIPLATELET THERAPY: ICD-10-CM

## 2019-10-16 DIAGNOSIS — Z01.818 PREOP TESTING: ICD-10-CM

## 2019-10-16 LAB
ANION GAP SERPL CALCULATED.3IONS-SCNC: 13 MMOL/L (ref 5–15)
APTT PPP: 34.7 SECONDS (ref 24.1–35)
BASOPHILS # BLD AUTO: 0.06 10*3/MM3 (ref 0–0.2)
BASOPHILS NFR BLD AUTO: 0.9 % (ref 0–1.5)
BUN BLD-MCNC: 8 MG/DL (ref 8–23)
BUN/CREAT SERPL: 12.3 (ref 7–25)
CALCIUM SPEC-SCNC: 9.3 MG/DL (ref 8.6–10.5)
CHLORIDE SERPL-SCNC: 96 MMOL/L (ref 98–107)
CO2 SERPL-SCNC: 30 MMOL/L (ref 22–29)
CREAT BLD-MCNC: 0.65 MG/DL (ref 0.57–1)
DEPRECATED RDW RBC AUTO: 42.6 FL (ref 37–54)
EOSINOPHIL # BLD AUTO: 0.36 10*3/MM3 (ref 0–0.4)
EOSINOPHIL NFR BLD AUTO: 5.3 % (ref 0.3–6.2)
ERYTHROCYTE [DISTWIDTH] IN BLOOD BY AUTOMATED COUNT: 12.3 % (ref 12.3–15.4)
GFR SERPL CREATININE-BSD FRML MDRD: 91 ML/MIN/1.73
GLUCOSE BLD-MCNC: 175 MG/DL (ref 65–99)
HCT VFR BLD AUTO: 49.2 % (ref 34–46.6)
HGB BLD-MCNC: 16.9 G/DL (ref 12–15.9)
IMM GRANULOCYTES # BLD AUTO: 0.03 10*3/MM3 (ref 0–0.05)
IMM GRANULOCYTES NFR BLD AUTO: 0.4 % (ref 0–0.5)
INR PPP: 0.84 (ref 0.91–1.09)
LYMPHOCYTES # BLD AUTO: 2.61 10*3/MM3 (ref 0.7–3.1)
LYMPHOCYTES NFR BLD AUTO: 38.2 % (ref 19.6–45.3)
MCH RBC QN AUTO: 32.2 PG (ref 26.6–33)
MCHC RBC AUTO-ENTMCNC: 34.3 G/DL (ref 31.5–35.7)
MCV RBC AUTO: 93.7 FL (ref 79–97)
MONOCYTES # BLD AUTO: 0.56 10*3/MM3 (ref 0.1–0.9)
MONOCYTES NFR BLD AUTO: 8.2 % (ref 5–12)
NEUTROPHILS # BLD AUTO: 3.21 10*3/MM3 (ref 1.7–7)
NEUTROPHILS NFR BLD AUTO: 47 % (ref 42.7–76)
NRBC BLD AUTO-RTO: 0 /100 WBC (ref 0–0.2)
PLATELET # BLD AUTO: 262 10*3/MM3 (ref 140–450)
PMV BLD AUTO: 9.3 FL (ref 6–12)
POTASSIUM BLD-SCNC: 4.5 MMOL/L (ref 3.5–5.2)
PROTHROMBIN TIME: 11.8 SECONDS (ref 11.9–14.6)
RBC # BLD AUTO: 5.25 10*6/MM3 (ref 3.77–5.28)
SODIUM BLD-SCNC: 139 MMOL/L (ref 136–145)
WBC NRBC COR # BLD: 6.83 10*3/MM3 (ref 3.4–10.8)

## 2019-10-16 PROCEDURE — 85025 COMPLETE CBC W/AUTO DIFF WBC: CPT | Performed by: NURSE PRACTITIONER

## 2019-10-16 PROCEDURE — 85730 THROMBOPLASTIN TIME PARTIAL: CPT | Performed by: NURSE PRACTITIONER

## 2019-10-16 PROCEDURE — 36415 COLL VENOUS BLD VENIPUNCTURE: CPT

## 2019-10-16 PROCEDURE — 80048 BASIC METABOLIC PNL TOTAL CA: CPT | Performed by: NURSE PRACTITIONER

## 2019-10-16 PROCEDURE — 85610 PROTHROMBIN TIME: CPT | Performed by: NURSE PRACTITIONER

## 2019-10-16 NOTE — DISCHARGE INSTRUCTIONS
DAY OF SURGERY INSTRUCTIONS        YOUR SURGEON: ***    PROCEDURE: ***ABDOMINAL AORTIC ANEURYSM REPAIR WITH ENDOGRAFT    DATE OF SURGERY: ***10/23/19    ARRIVAL TIME: AS DIRECTED BY OFFICE    YOU MAY TAKE THE FOLLOWING MEDICATION(S) THE MORNING OF SURGERY WITH A SIP OF WATER: ***as directed by your doctor      ALL OTHER HOME MEDICATION CHECK WITH YOUR PHYSICIAN                MANAGING PAIN AFTER SURGERY    We know you are probably wondering what your pain will be like after surgery.  Following surgery it is unrealistic to expect you will not have pain.   Pain is how our bodies let us know that something is wrong or cautions us to be careful.  That said, our goal is to make your pain tolerable.    Methods we may use to treat your pain include (oral or IV medications, PCAs, epidurals, nerve blocks, etc.)   While some procedures require IV pain medications for a short time after surgery, transitioning to pain medications by mouth allows for better management of pain.   Your nurse will encourage you to take oral pain medications whenever possible.  IV medications work almost immediately, but only last a short while.  Taking medications by mouth allows for a more constant level of medication in your blood stream for a longer period of time.      Once your pain is out of control it is harder to get back under control.  It is important you are aware when your next dose of pain medication is due.  If you are admitted, your nurse may write the time of your next dose on the white board in your room to help you remember.      We are interested in your pain and encourage you to inform us about aggravating factors during your visit.   Many times a simple repositioning every few hours can make a big difference.    If your physician says it is okay, do not let your pain prevent you from getting out of bed. Be sure to call your nurse for assistance prior to getting up so you do not fall.      Before surgery, please  decide your tolerable pain goal.  These faces help describe the pain ratings we use on a 0-10 scale.   Be prepared to tell us your goal and whether or not you take pain or anxiety medications at home.          BEFORE YOU COME TO THE HOSPITAL  (Pre-op instructions)  • Do not eat, drink, smoke or chew gum after midnight the night before surgery.  This also includes no mints.  • Morning of surgery take only the medicines you have been instructed with a sip of water unless otherwise instructed  by your physician.  • Do not shave, wear makeup or dark nail polish.  • Remove all jewelry including rings.  • Leave anything you consider valuable at home.  • Leave your suitcase in the car until after your surgery.  • Bring the following with you if applicable:  o Picture ID and insurance, Medicare or Medicaid cards  o Co-pay/deductible required by insurance (cash, check, credit card)  o Copy of advance directive, living will or power-of- documents if not brought to PAT  o CPAP or BIPAP mask and tubing  o Relaxation aids ( book, magazine), etc.  o Hearing aids                        ON THE DAY OF SURGERY  · On the day of surgery check in at registration located at the main entrance of the hospital.   ? You will be registered and given a beeper with instructions where to wait in the main lobby.  ? When your beeper lights up and vibrates a member of the Outpatient Surgery staff will meet you at the double doors under the stair steps and escort you to your preoperative room.   · You may have cloth compression devices placed on your legs. These help to prevent blood clots and reduce swelling in your legs.  · An IV may be inserted into one of your veins.  · In the operating room, you may be given one or more of the following:  ? A medicine to help you relax (sedative).  ? A medicine to numb the area (local anesthetic).  ? A medicine to make you fall asleep (general anesthetic).  ? A medicine that is injected into an area of  "your body to numb everything below the injection site (regional anesthetic).  · Your surgical site will be marked or identified.  · You may be given an antibiotic through your IV to help prevent infection.  Contact a health care provider if you:  · Develop a fever of more than 100.4°F (38°C) or other feelings of illness during the 48 hours before your surgery.  · Have symptoms that get worse.  Have questions or concerns about your surgery    General Anesthesia/Surgery, Adult  General anesthesia is the use of medicines to make a person \"go to sleep\" (unconscious) for a medical procedure. General anesthesia must be used for certain procedures, and is often recommended for procedures that:  · Last a long time.  · Require you to be still or in an unusual position.  · Are major and can cause blood loss.  The medicines used for general anesthesia are called general anesthetics. As well as making you unconscious for a certain amount of time, these medicines:  · Prevent pain.  · Control your blood pressure.  · Relax your muscles.  Tell a health care provider about:  · Any allergies you have.  · All medicines you are taking, including vitamins, herbs, eye drops, creams, and over-the-counter medicines.  · Any problems you or family members have had with anesthetic medicines.  · Types of anesthetics you have had in the past.  · Any blood disorders you have.  · Any surgeries you have had.  · Any medical conditions you have.  · Any recent upper respiratory, chest, or ear infections.  · Any history of:  ? Heart or lung conditions, such as heart failure, sleep apnea, asthma, or chronic obstructive pulmonary disease (COPD).  ?  service.  ? Depression or anxiety.  · Any tobacco or drug use, including marijuana or alcohol use.  · Whether you are pregnant or may be pregnant.  What are the risks?  Generally, this is a safe procedure. However, problems may occur, including:  · Allergic reaction.  · Lung and heart " problems.  · Inhaling food or liquid from the stomach into the lungs (aspiration).  · Nerve injury.  · Air in the bloodstream, which can lead to stroke.  · Extreme agitation or confusion (delirium) when you wake up from the anesthetic.  · Waking up during your procedure and being unable to move. This is rare.  These problems are more likely to develop if you are having a major surgery or if you have an advanced or serious medical condition. You can prevent some of these complications by answering all of your health care provider's questions thoroughly and by following all instructions before your procedure.  General anesthesia can cause side effects, including:  · Nausea or vomiting.  · A sore throat from the breathing tube.  · Hoarseness.  · Wheezing or coughing.  · Shaking chills.  · Tiredness.  · Body aches.  · Anxiety.  · Sleepiness or drowsiness.  · Confusion or agitation.  RISKS AND COMPLICATIONS OF SURGERY  Your health care provider will discuss possible risks and complications with you before surgery. Common risks and complications include:    · Problems due to the use of anesthetics.  · Blood loss and replacement (does not apply to minor surgical procedures).  · Temporary increase in pain due to surgery.  · Uncorrected pain or problems that the surgery was meant to correct.  · Infection.  · New damage.    What happens before the procedure?    Medicines  Ask your health care provider about:  · Changing or stopping your regular medicines. This is especially important if you are taking diabetes medicines or blood thinners.  · Taking medicines such as aspirin and ibuprofen. These medicines can thin your blood. Do not take these medicines unless your health care provider tells you to take them.  · Taking over-the-counter medicines, vitamins, herbs, and supplements. Do not take these during the week before your procedure unless your health care provider approves them.  General instructions  · Starting 3-6 weeks  before the procedure, do not use any products that contain nicotine or tobacco, such as cigarettes and e-cigarettes. If you need help quitting, ask your health care provider.  · If you brush your teeth on the morning of the procedure, make sure to spit out all of the toothpaste.  · Tell your health care provider if you become ill or develop a cold, cough, or fever.  · If instructed by your health care provider, bring your sleep apnea device with you on the day of your surgery (if applicable).  · Ask your health care provider if you will be going home the same day, the following day, or after a longer hospital stay.  ? Plan to have someone take you home from the hospital or clinic.  ? Plan to have a responsible adult care for you for at least 24 hours after you leave the hospital or clinic. This is important.  What happens during the procedure?  · You will be given anesthetics through both of the following:  ? A mask placed over your nose and mouth.  ? An IV in one of your veins.  · You may receive a medicine to help you relax (sedative).  · After you are unconscious, a breathing tube may be inserted down your throat to help you breathe. This will be removed before you wake up.  · An anesthesia specialist will stay with you throughout your procedure. He or she will:  ? Keep you comfortable and safe by continuing to give you medicines and adjusting the amount of medicine that you get.  ? Monitor your blood pressure, pulse, and oxygen levels to make sure that the anesthetics do not cause any problems.  The procedure may vary among health care providers and hospitals.  What happens after the procedure?  · Your blood pressure, temperature, heart rate, breathing rate, and blood oxygen level will be monitored until the medicines you were given have worn off.  · You will wake up in a recovery area. You may wake up slowly.  · If you feel anxious or agitated, you may be given medicine to help you calm down.  · If you will be  going home the same day, your health care provider may check to make sure you can walk, drink, and urinate.  · Your health care provider will treat any pain or side effects you have before you go home.  · Do not drive for 24 hours if you were given a sedative.  Summary  · General anesthesia is used to keep you still and prevent pain during a procedure.  · It is important to tell your healthcare provider about your medical history and any surgeries you have had, and previous experience with anesthesia.  · Follow your healthcare provider’s instructions about when to stop eating, drinking, or taking certain medicines before your procedure.  · Plan to have someone take you home from the hospital or clinic.  This information is not intended to replace advice given to you by your health care provider. Make sure you discuss any questions you have with your health care provider.  Document Released: 03/26/2009 Document Revised: 08/03/2018 Document Reviewed: 08/03/2018  [employer] Interactive Patient Education © 2019 [employer]       Fall Prevention in Hospitals, Adult  As a hospital patient, your condition and the treatments you receive can increase your risk for falls. Some additional risk factors for falls in a hospital include:  · Being in an unfamiliar environment.  · Being on bed rest.  · Your surgery.  · Taking certain medicines.  · Your tubing requirements, such as intravenous (IV) therapy or catheters.  It is important that you learn how to decrease fall risks while at the hospital. Below are important tips that can help prevent falls.  SAFETY TIPS FOR PREVENTING FALLS  Talk about your risk of falling.  · Ask your health care provider why you are at risk for falling. Is it your medicine, illness, tubing placement, or something else?  · Make a plan with your health care provider to keep you safe from falls.  · Ask your health care provider or pharmacist about side effects of your medicines. Some medicines can make  you dizzy or affect your coordination.  Ask for help.  · Ask for help before getting out of bed. You may need to press your call button.  · Ask for assistance in getting safely to the toilet.  · Ask for a walker or cane to be put at your bedside. Ask that most of the side rails on your bed be placed up before your health care provider leaves the room.  · Ask family or friends to sit with you.  · Ask for things that are out of your reach, such as your glasses, hearing aids, telephone, bedside table, or call button.  Follow these tips to avoid falling:  · Stay lying or seated, rather than standing, while waiting for help.  · Wear rubber-soled slippers or shoes whenever you walk in the hospital.  · Avoid quick, sudden movements.  ¨ Change positions slowly.  ¨ Sit on the side of your bed before standing.  ¨ Stand up slowly and wait before you start to walk.  · Let your health care provider know if there is a spill on the floor.  · Pay careful attention to the medical equipment, electrical cords, and tubes around you.  · When you need help, use your call button by your bed or in the bathroom. Wait for one of your health care providers to help you.  · If you feel dizzy or unsure of your footing, return to bed and wait for assistance.  · Avoid being distracted by the TV, telephone, or another person in your room.  · Do not lean or support yourself on rolling objects, such as IV poles or bedside tables.     This information is not intended to replace advice given to you by your health care provider. Make sure you discuss any questions you have with your health care provider.     Document Released: 12/15/2001 Document Revised: 01/08/2016 Document Reviewed: 08/25/2013  [employer] Interactive Patient Education ©2016 [employer]       Surgical Site Infections FAQs  What is a Surgical Site Infection (SSI)?  A surgical site infection is an infection that occurs after surgery in the part of the body where the surgery took place.  Most patients who have surgery do not develop an infection. However, infections develop in about 1 to 3 out of every 100 patients who have surgery.  Some of the common symptoms of a surgical site infection are:  · Redness and pain around the area where you had surgery  · Drainage of cloudy fluid from your surgical wound  · Fever  Can SSIs be treated?  Yes. Most surgical site infections can be treated with antibiotics. The antibiotic given to you depends on the bacteria (germs) causing the infection. Sometimes patients with SSIs also need another surgery to treat the infection.  What are some of the things that hospitals are doing to prevent SSIs?  To prevent SSIs, doctors, nurses, and other healthcare providers:  · Clean their hands and arms up to their elbows with an antiseptic agent just before the surgery.  · Clean their hands with soap and water or an alcohol-based hand rub before and after caring for each patient.  · May remove some of your hair immediately before your surgery using electric clippers if the hair is in the same area where the procedure will occur. They should not shave you with a razor.  · Wear special hair covers, masks, gowns, and gloves during surgery to keep the surgery area clean.  · Give you antibiotics before your surgery starts. In most cases, you should get antibiotics within 60 minutes before the surgery starts and the antibiotics should be stopped within 24 hours after surgery.  · Clean the skin at the site of your surgery with a special soap that kills germs.  What can I do to help prevent SSIs?  Before your surgery:  · Tell your doctor about other medical problems you may have. Health problems such as allergies, diabetes, and obesity could affect your surgery and your treatment.  · Quit smoking. Patients who smoke get more infections. Talk to your doctor about how you can quit before your surgery.  · Do not shave near where you will have surgery. Shaving with a razor can irritate  your skin and make it easier to develop an infection.  At the time of your surgery:  · Speak up if someone tries to shave you with a razor before surgery. Ask why you need to be shaved and talk with your surgeon if you have any concerns.  · Ask if you will get antibiotics before surgery.  After your surgery:  · Make sure that your healthcare providers clean their hands before examining you, either with soap and water or an alcohol-based hand rub.  · If you do not see your providers clean their hands, please ask them to do so.  · Family and friends who visit you should not touch the surgical wound or dressings.  · Family and friends should clean their hands with soap and water or an alcohol-based hand rub before and after visiting you. If you do not see them clean their hands, ask them to clean their hands.  What do I need to do when I go home from the hospital?  · Before you go home, your doctor or nurse should explain everything you need to know about taking care of your wound. Make sure you understand how to care for your wound before you leave the hospital.  · Always clean your hands before and after caring for your wound.  · Before you go home, make sure you know who to contact if you have questions or problems after you get home.  · If you have any symptoms of an infection, such as redness and pain at the surgery site, drainage, or fever, call your doctor immediately.  If you have additional questions, please ask your doctor or nurse.  Developed and co-sponsored by The Society for Healthcare Epidemiology of [place] (SHEA); Infectious Diseases Society of [place] (IDSA); American Hospital Association; Association for Professionals in Infection Control and Epidemiology (APIC); Centers for Disease Control and Prevention (CDC); and The Joint Commission.     This information is not intended to replace advice given to you by your health care provider. Make sure you discuss any questions you have with your health care  provider.     Document Released: 12/23/2014 Document Revised: 01/08/2016 Document Reviewed: 03/02/2016  [employer] Interactive Patient Education ©2016 [employer]           [facility]  CHG 4% Patient Instruction Sheet    Chlorhexidine Before Surgery  Chlorhexidine gluconate (CHG) is a germ-killing (antiseptic) solution that is used to clean the skin. It gets rid of the bacteria that normally live on the skin. Cleaning your skin with CHG before surgery helps lower the risk for infection after surgery.    How to use CHG solution  · You will take 2 showers, one shower the night before surgery, the second shower the morning of surgery before coming to the hospital.  · Use CHG only as told by your health care provider, and follow the instructions on the label.  · Use CHG solution while taking a shower. Follow these steps when using CHG solution (unless your health care provider gives you different instructions):  1. Start the shower.  2. Use your normal soap and shampoo to wash your face and hair.  3. Turn off the shower or move out of the shower stream.  4. Pour the CHG onto a clean washcloth. Do not use any type of brush or rough-edged sponge.  5. Starting at your neck, lather your body down to your toes. Make sure you:  6. Pay special attention to the part of your body where you will be having surgery. Scrub this area for at least 1 minute.  7. Use the full amount of CHG as directed. Usually, this is one half bottle for each shower.  8. Do not use CHG on your head or face. If the solution gets into your ears or eyes, rinse them well with water.  9. Avoid your genital area.  10. Avoid any areas of skin that have broken skin, cuts, or scrapes.  11. Scrub your back and under your arms. Make sure to wash skin folds.  12. Let the lather sit on your skin for 1-2 minutes or as long as told by your health care  provider.  13. Thoroughly rinse your entire body in the shower. Make sure that all body creases and  crevices are rinsed well.  14. Dry off with a clean towel. Do not put any substances on your body afterward, such as powder, lotion, or perfume.  15. Put on clean clothes or pajamas.  16. If it is the night before your surgery, sleep in clean sheets.    What are the risks?  Risks of using CHG include:  · A skin reaction.  · Hearing loss, if CHG gets in your ears.  · Eye injury, if CHG gets in your eyes and is not rinsed out.  · The CHG product catching fire.  Make sure that you avoid smoking and flames after applying CHG to your skin.  Do not use CHG:  · If you have a chlorhexidine allergy or have previously reacted to chlorhexidine.  · On babies younger than 2 months of age.      On the day of surgery, when you are taken to your room in Outpatient Surgery you will be given a CHG prepackaged cloth to wipe the site for your surgery.  How to use CHG prepackaged cloths  · Follow the instructions on the label.  · Use the CHG cloth on clean, dry skin. Follow these steps when using a CHG cloth (unless your health care provider gives you different instructions):  1. Using the CHG cloth, vigorously scrub the part of your body where you will be having surgery. Scrub using a back-and-forth motion for 3 minutes. The area on your body should be completely wet with CHG when you are finished scrubbing.  2. Do not rinse. Discard the cloth and let the area air-dry for 1 minute. Do not put any substances on your body afterward, such as powder, lotion, or perfume.  Contact a health care provider if:  · Your skin gets irritated after scrubbing.  · You have questions about using your solution or cloth.  Get help right away if:  · Your eyes become very red or swollen.  · Your eyes itch badly.  · Your skin itches badly and is red or swollen.  · Your hearing changes.  · You have trouble seeing.  · You have swelling or tingling in your mouth or throat.  · You have trouble breathing.  · You swallow any  chlorhexidine.  Summary  · Chlorhexidine gluconate (CHG) is a germ-killing (antiseptic) solution that is used to clean the skin. Cleaning your skin with CHG before surgery helps lower the risk for infection after surgery.  · You may be given CHG to use at home. It may be in a bottle or in a prepackaged cloth to use on your skin. Carefully follow your health care provider's instructions and the instructions on the product label.  · Do not use CHG if you have a chlorhexidine allergy.  · Contact your health care provider if your skin gets irritated after scrubbing.  This information is not intended to replace advice given to you by your health care provider. Make sure you discuss any questions you have with your health care provider.  Document Released: 09/11/2013 Document Revised: 11/15/2018 Document Reviewed: 11/15/2018  [employer] Interactive Patient Education © 2019 [employer]          PATIENT/FAMILY/RESPONSIBLE PARTY VERBALIZES UNDERSTANDING OF ABOVE EDUCATION.  COPY OF PAIN SCALE GIVEN AND REVIEWED WITH VERBALIZED UNDERSTANDING.

## 2019-10-17 ENCOUNTER — TELEPHONE (OUTPATIENT)
Dept: VASCULAR SURGERY | Facility: CLINIC | Age: 64
End: 2019-10-17

## 2019-10-17 NOTE — TELEPHONE ENCOUNTER
Ms Daniels called in wanting to know if she needed to hold her Krill Oil and 81 mg Aspirin before surgery.     Spoke with Kate Abrams and Stacia GOMEZ they stated she didn't have to stop any of her medications until the morning of her procedure and she is to take nothing.     Left message letting Ms Daniels know that she didn't have to stop her Krill Oil or 81 mg Aspirin that she could continue on those until the morning of her procedure and she was to take nothing the morning of. I advised if she had any other questions or concerns to please call the office at [phone].

## 2019-10-21 ENCOUNTER — TELEPHONE (OUTPATIENT)
Dept: VASCULAR SURGERY | Facility: CLINIC | Age: 64
End: 2019-10-21

## 2019-10-21 NOTE — TELEPHONE ENCOUNTER
Patient called and stated that she was sick with a fever and wanted to cancel her procedure for 10/23/2019.  Patient procedure was cancelled and moved to 11/4/2019.

## 2019-10-31 ENCOUNTER — TELEPHONE (OUTPATIENT)
Dept: VASCULAR SURGERY | Facility: CLINIC | Age: 64
End: 2019-10-31

## 2019-10-31 NOTE — TELEPHONE ENCOUNTER
Left message advising patient that her arrival time for her procedure on 11/4/2019 had been moved up to 600 am.

## 2019-11-04 ENCOUNTER — TELEPHONE (OUTPATIENT)
Dept: VASCULAR SURGERY | Facility: CLINIC | Age: 64
End: 2019-11-04

## 2019-11-04 PROBLEM — Z87.891 PERSONAL HISTORY OF NICOTINE DEPENDENCE: Status: ACTIVE | Noted: 2019-11-04

## 2019-11-04 PROBLEM — Z01.818 PRE-OP EVALUATION: Status: RESOLVED | Noted: 2019-09-24 | Resolved: 2019-11-04

## 2019-11-04 PROBLEM — Z01.811 PREOPERATIVE RESPIRATORY EXAMINATION: Status: ACTIVE | Noted: 2019-09-27

## 2019-11-04 NOTE — TELEPHONE ENCOUNTER
"Patient called and stated that she was already to go this morning and that \"something told her not to do it this morning\"  She was scheduled for surgery with Dr. Ruiz today.  Patient stated that her sister had had a surgery christine her at [facility] and that she was no on [facility] and that she was worried about her and herself.   She asked if she would be able to take a Xanex before she came.  I advised her that she would not be able to do that but that pre op would be able to give her medicine to help her calm down.  I asked if she had already eaten this morning and she stated that she had eaten shin and eggs at 800 am and that her son had already gone to work so there was no one that would be able to bring her today.  I advised that I would let Dr. Ruiz know and that I would contact her back when I had spoken to him.  Dr. Ruiz advised.   "

## 2019-11-05 ENCOUNTER — TELEPHONE (OUTPATIENT)
Dept: VASCULAR SURGERY | Facility: CLINIC | Age: 64
End: 2019-11-05

## 2019-11-05 PROBLEM — Z01.818 PREOP TESTING: Status: ACTIVE | Noted: 2019-09-27

## 2019-11-05 NOTE — TELEPHONE ENCOUNTER
Patient cancelled her surgery on 11/4/2019.  It has been rescheduled for 11/20/2019. Patient pre work is scheduled for 11/15/2019 at 1015 am.  Patient procedure is scheduled for 11/20/2019 at 515 am.  Patient advised of location time and prep.  Patient expressed understanding for all that was discussed.   All information was mailed to the patient.

## 2019-11-20 ENCOUNTER — TELEPHONE (OUTPATIENT)
Dept: VASCULAR SURGERY | Facility: CLINIC | Age: 64
End: 2019-11-20

## 2019-11-20 NOTE — TELEPHONE ENCOUNTER
Late entry: Patient called yesterday, 11/19/2019 and asked if we could reschedule her surgery because there was a prayer service that she wanted to go to on the day of her procedure, 11/20/2019.  I advised that it would not be a good idea to reschedule this procedure because we had already cancelled once and that we had to reschedule with the rep that would be assisting.  Patient expressed understanding and stated that she would be here for her procedure.      Received call from patient son on 11/20/2019, morning of patient procedure. He stated that patient Father in law had passed away and that he wanted to get his mother rescheduled as soon as possible.  He kept insisting how serious this was and that he wanted it done sooner than later.  I advised that I had spoken with his mother on multiple occasion stressing the importance of having this procedure completed.  Advised that it appeared that the third surgical date would be 12/11/2019.  The rep that will be assisting Dr. Ruiz will be here for a procedure that date.  Again advised that Dr. Ruiz surgical schedule is very booked and that appeared to be the first available time available but that I would also double check with Dr. Ruiz that he felt that the patient would be able to wait that amount of time for the procedure.  Advised Dr. Ruiz of all that occurred.

## 2019-11-21 ENCOUNTER — TELEPHONE (OUTPATIENT)
Dept: VASCULAR SURGERY | Facility: CLINIC | Age: 64
End: 2019-11-21

## 2019-11-21 NOTE — TELEPHONE ENCOUNTER
Spoke with patient and advised of upcoming procedure.  Patient pre work is scheduled for 12/4/2019 at 115 pm.  Patient procedure is scheduled for 12/11/2019 at 600 am.  Patient advised of location time and prep.  Patient expressed understanding for all that was discussed.  Al information was mailed to the patient

## 2019-12-04 ENCOUNTER — APPOINTMENT (OUTPATIENT)
Dept: PREADMISSION TESTING | Facility: HOSPITAL | Age: 64
End: 2019-12-04

## 2019-12-04 VITALS
DIASTOLIC BLOOD PRESSURE: 67 MMHG | BODY MASS INDEX: 24.23 KG/M2 | HEART RATE: 66 BPM | HEIGHT: 66 IN | SYSTOLIC BLOOD PRESSURE: 152 MMHG | RESPIRATION RATE: 22 BRPM | OXYGEN SATURATION: 93 % | WEIGHT: 150.79 LBS

## 2019-12-04 LAB
ANION GAP SERPL CALCULATED.3IONS-SCNC: 12 MMOL/L (ref 5–15)
BUN BLD-MCNC: 10 MG/DL (ref 8–23)
BUN/CREAT SERPL: 14.5 (ref 7–25)
CALCIUM SPEC-SCNC: 9.7 MG/DL (ref 8.6–10.5)
CHLORIDE SERPL-SCNC: 97 MMOL/L (ref 98–107)
CO2 SERPL-SCNC: 29 MMOL/L (ref 22–29)
CREAT BLD-MCNC: 0.69 MG/DL (ref 0.57–1)
DEPRECATED RDW RBC AUTO: 41.1 FL (ref 37–54)
ERYTHROCYTE [DISTWIDTH] IN BLOOD BY AUTOMATED COUNT: 11.8 % (ref 12.3–15.4)
GFR SERPL CREATININE-BSD FRML MDRD: 85 ML/MIN/1.73
GLUCOSE BLD-MCNC: 186 MG/DL (ref 65–99)
HCT VFR BLD AUTO: 48.9 % (ref 34–46.6)
HGB BLD-MCNC: 16.5 G/DL (ref 12–15.9)
MCH RBC QN AUTO: 32.2 PG (ref 26.6–33)
MCHC RBC AUTO-ENTMCNC: 33.7 G/DL (ref 31.5–35.7)
MCV RBC AUTO: 95.3 FL (ref 79–97)
PLATELET # BLD AUTO: 278 10*3/MM3 (ref 140–450)
PMV BLD AUTO: 9.2 FL (ref 6–12)
POTASSIUM BLD-SCNC: 4.3 MMOL/L (ref 3.5–5.2)
RBC # BLD AUTO: 5.13 10*6/MM3 (ref 3.77–5.28)
SODIUM BLD-SCNC: 138 MMOL/L (ref 136–145)
WBC NRBC COR # BLD: 7.44 10*3/MM3 (ref 3.4–10.8)

## 2019-12-04 PROCEDURE — 85027 COMPLETE CBC AUTOMATED: CPT | Performed by: SURGERY

## 2019-12-04 PROCEDURE — 80048 BASIC METABOLIC PNL TOTAL CA: CPT | Performed by: SURGERY

## 2019-12-04 PROCEDURE — 36415 COLL VENOUS BLD VENIPUNCTURE: CPT

## 2019-12-04 NOTE — DISCHARGE INSTRUCTIONS
DAY OF SURGERY INSTRUCTIONS        YOUR SURGEON: ALPA    PROCEDURE:ABDOMINAL AORTIC ANEURYSM REPAIR WITH ENDOGRAFT    DATE OF SURGERY: DEC 11 2019    ARRIVAL TIME: AS DIRECTED BY OFFICE    YOU MAY TAKE THE FOLLOWING MEDICATION(S) THE MORNING OF SURGERY WITH A SIP OF WATER: PERCOCET      ALL OTHER HOME MEDICATION CHECK WITH YOUR PHYSICIAN                MANAGING PAIN AFTER SURGERY    We know you are probably wondering what your pain will be like after surgery.  Following surgery it is unrealistic to expect you will not have pain.   Pain is how our bodies let us know that something is wrong or cautions us to be careful.  That said, our goal is to make your pain tolerable.    Methods we may use to treat your pain include (oral or IV medications, PCAs, epidurals, nerve blocks, etc.)   While some procedures require IV pain medications for a short time after surgery, transitioning to pain medications by mouth allows for better management of pain.   Your nurse will encourage you to take oral pain medications whenever possible.  IV medications work almost immediately, but only last a short while.  Taking medications by mouth allows for a more constant level of medication in your blood stream for a longer period of time.      Once your pain is out of control it is harder to get back under control.  It is important you are aware when your next dose of pain medication is due.  If you are admitted, your nurse may write the time of your next dose on the white board in your room to help you remember.      We are interested in your pain and encourage you to inform us about aggravating factors during your visit.   Many times a simple repositioning every few hours can make a big difference.    If your physician says it is okay, do not let your pain prevent you from getting out of bed. Be sure to call your nurse for assistance prior to getting up so you do not fall.      Before surgery, please decide your tolerable pain goal.   These faces help describe the pain ratings we use on a 0-10 scale.   Be prepared to tell us your goal and whether or not you take pain or anxiety medications at home.          BEFORE YOU COME TO THE HOSPITAL  (Pre-op instructions)  • Do not eat, drink, smoke or chew gum after midnight the night before surgery.  This also includes no mints.  • Morning of surgery take only the medicines you have been instructed with a sip of water unless otherwise instructed  by your physician.  • Do not shave, wear makeup or dark nail polish.  • Remove all jewelry including rings.  • Leave anything you consider valuable at home.  • Leave your suitcase in the car until after your surgery.  • Bring the following with you if applicable:  o Picture ID and insurance, Medicare or Medicaid cards  o Co-pay/deductible required by insurance (cash, check, credit card)  o Copy of advance directive, living will or power-of- documents if not brought to PAT  o CPAP or BIPAP mask and tubing  o Relaxation aids ( book, magazine), etc.  o Hearing aids                        ON THE DAY OF SURGERY  · On the day of surgery check in at registration located at the main entrance of the hospital.   ? You will be registered and given a beeper with instructions where to wait in the main lobby.  ? When your beeper lights up and vibrates a member of the Outpatient Surgery staff will meet you at the double doors under the stair steps and escort you to your preoperative room.   · You may have cloth compression devices placed on your legs. These help to prevent blood clots and reduce swelling in your legs.  · An IV may be inserted into one of your veins.  · In the operating room, you may be given one or more of the following:  ? A medicine to help you relax (sedative).  ? A medicine to numb the area (local anesthetic).  ? A medicine to make you fall asleep (general anesthetic).  ? A medicine that is injected into an area of your body to numb everything below  "the injection site (regional anesthetic).  · Your surgical site will be marked or identified.  · You may be given an antibiotic through your IV to help prevent infection.  Contact a health care provider if you:  · Develop a fever of more than 100.4°F (38°C) or other feelings of illness during the 48 hours before your surgery.  · Have symptoms that get worse.  Have questions or concerns about your surgery    General Anesthesia/Surgery, Adult  General anesthesia is the use of medicines to make a person \"go to sleep\" (unconscious) for a medical procedure. General anesthesia must be used for certain procedures, and is often recommended for procedures that:  · Last a long time.  · Require you to be still or in an unusual position.  · Are major and can cause blood loss.  The medicines used for general anesthesia are called general anesthetics. As well as making you unconscious for a certain amount of time, these medicines:  · Prevent pain.  · Control your blood pressure.  · Relax your muscles.  Tell a health care provider about:  · Any allergies you have.  · All medicines you are taking, including vitamins, herbs, eye drops, creams, and over-the-counter medicines.  · Any problems you or family members have had with anesthetic medicines.  · Types of anesthetics you have had in the past.  · Any blood disorders you have.  · Any surgeries you have had.  · Any medical conditions you have.  · Any recent upper respiratory, chest, or ear infections.  · Any history of:  ? Heart or lung conditions, such as heart failure, sleep apnea, asthma, or chronic obstructive pulmonary disease (COPD).  ?  service.  ? Depression or anxiety.  · Any tobacco or drug use, including marijuana or alcohol use.  · Whether you are pregnant or may be pregnant.  What are the risks?  Generally, this is a safe procedure. However, problems may occur, including:  · Allergic reaction.  · Lung and heart problems.  · Inhaling food or liquid from the " stomach into the lungs (aspiration).  · Nerve injury.  · Air in the bloodstream, which can lead to stroke.  · Extreme agitation or confusion (delirium) when you wake up from the anesthetic.  · Waking up during your procedure and being unable to move. This is rare.  These problems are more likely to develop if you are having a major surgery or if you have an advanced or serious medical condition. You can prevent some of these complications by answering all of your health care provider's questions thoroughly and by following all instructions before your procedure.  General anesthesia can cause side effects, including:  · Nausea or vomiting.  · A sore throat from the breathing tube.  · Hoarseness.  · Wheezing or coughing.  · Shaking chills.  · Tiredness.  · Body aches.  · Anxiety.  · Sleepiness or drowsiness.  · Confusion or agitation.  RISKS AND COMPLICATIONS OF SURGERY  Your health care provider will discuss possible risks and complications with you before surgery. Common risks and complications include:    · Problems due to the use of anesthetics.  · Blood loss and replacement (does not apply to minor surgical procedures).  · Temporary increase in pain due to surgery.  · Uncorrected pain or problems that the surgery was meant to correct.  · Infection.  · New damage.    What happens before the procedure?    Medicines  Ask your health care provider about:  · Changing or stopping your regular medicines. This is especially important if you are taking diabetes medicines or blood thinners.  · Taking medicines such as aspirin and ibuprofen. These medicines can thin your blood. Do not take these medicines unless your health care provider tells you to take them.  · Taking over-the-counter medicines, vitamins, herbs, and supplements. Do not take these during the week before your procedure unless your health care provider approves them.  General instructions  · Starting 3-6 weeks before the procedure, do not use any products  that contain nicotine or tobacco, such as cigarettes and e-cigarettes. If you need help quitting, ask your health care provider.  · If you brush your teeth on the morning of the procedure, make sure to spit out all of the toothpaste.  · Tell your health care provider if you become ill or develop a cold, cough, or fever.  · If instructed by your health care provider, bring your sleep apnea device with you on the day of your surgery (if applicable).  · Ask your health care provider if you will be going home the same day, the following day, or after a longer hospital stay.  ? Plan to have someone take you home from the hospital or clinic.  ? Plan to have a responsible adult care for you for at least 24 hours after you leave the hospital or clinic. This is important.  What happens during the procedure?  · You will be given anesthetics through both of the following:  ? A mask placed over your nose and mouth.  ? An IV in one of your veins.  · You may receive a medicine to help you relax (sedative).  · After you are unconscious, a breathing tube may be inserted down your throat to help you breathe. This will be removed before you wake up.  · An anesthesia specialist will stay with you throughout your procedure. He or she will:  ? Keep you comfortable and safe by continuing to give you medicines and adjusting the amount of medicine that you get.  ? Monitor your blood pressure, pulse, and oxygen levels to make sure that the anesthetics do not cause any problems.  The procedure may vary among health care providers and hospitals.  What happens after the procedure?  · Your blood pressure, temperature, heart rate, breathing rate, and blood oxygen level will be monitored until the medicines you were given have worn off.  · You will wake up in a recovery area. You may wake up slowly.  · If you feel anxious or agitated, you may be given medicine to help you calm down.  · If you will be going home the same day, your health care  provider may check to make sure you can walk, drink, and urinate.  · Your health care provider will treat any pain or side effects you have before you go home.  · Do not drive for 24 hours if you were given a sedative.  Summary  · General anesthesia is used to keep you still and prevent pain during a procedure.  · It is important to tell your healthcare provider about your medical history and any surgeries you have had, and previous experience with anesthesia.  · Follow your healthcare provider’s instructions about when to stop eating, drinking, or taking certain medicines before your procedure.  · Plan to have someone take you home from the hospital or clinic.  This information is not intended to replace advice given to you by your health care provider. Make sure you discuss any questions you have with your health care provider.  Document Released: 03/26/2009 Document Revised: 08/03/2018 Document Reviewed: 08/03/2018  [employer] Interactive Patient Education © 2019 [employer]       Fall Prevention in Hospitals, Adult  As a hospital patient, your condition and the treatments you receive can increase your risk for falls. Some additional risk factors for falls in a hospital include:  · Being in an unfamiliar environment.  · Being on bed rest.  · Your surgery.  · Taking certain medicines.  · Your tubing requirements, such as intravenous (IV) therapy or catheters.  It is important that you learn how to decrease fall risks while at the hospital. Below are important tips that can help prevent falls.  SAFETY TIPS FOR PREVENTING FALLS  Talk about your risk of falling.  · Ask your health care provider why you are at risk for falling. Is it your medicine, illness, tubing placement, or something else?  · Make a plan with your health care provider to keep you safe from falls.  · Ask your health care provider or pharmacist about side effects of your medicines. Some medicines can make you dizzy or affect your coordination.  Ask  for help.  · Ask for help before getting out of bed. You may need to press your call button.  · Ask for assistance in getting safely to the toilet.  · Ask for a walker or cane to be put at your bedside. Ask that most of the side rails on your bed be placed up before your health care provider leaves the room.  · Ask family or friends to sit with you.  · Ask for things that are out of your reach, such as your glasses, hearing aids, telephone, bedside table, or call button.  Follow these tips to avoid falling:  · Stay lying or seated, rather than standing, while waiting for help.  · Wear rubber-soled slippers or shoes whenever you walk in the hospital.  · Avoid quick, sudden movements.  ¨ Change positions slowly.  ¨ Sit on the side of your bed before standing.  ¨ Stand up slowly and wait before you start to walk.  · Let your health care provider know if there is a spill on the floor.  · Pay careful attention to the medical equipment, electrical cords, and tubes around you.  · When you need help, use your call button by your bed or in the bathroom. Wait for one of your health care providers to help you.  · If you feel dizzy or unsure of your footing, return to bed and wait for assistance.  · Avoid being distracted by the TV, telephone, or another person in your room.  · Do not lean or support yourself on rolling objects, such as IV poles or bedside tables.     This information is not intended to replace advice given to you by your health care provider. Make sure you discuss any questions you have with your health care provider.     Document Released: 12/15/2001 Document Revised: 01/08/2016 Document Reviewed: 08/25/2013  [employer] Interactive Patient Education ©2016 [employer]       Surgical Site Infections FAQs  What is a Surgical Site Infection (SSI)?  A surgical site infection is an infection that occurs after surgery in the part of the body where the surgery took place. Most patients who have surgery do not develop  an infection. However, infections develop in about 1 to 3 out of every 100 patients who have surgery.  Some of the common symptoms of a surgical site infection are:  · Redness and pain around the area where you had surgery  · Drainage of cloudy fluid from your surgical wound  · Fever  Can SSIs be treated?  Yes. Most surgical site infections can be treated with antibiotics. The antibiotic given to you depends on the bacteria (germs) causing the infection. Sometimes patients with SSIs also need another surgery to treat the infection.  What are some of the things that hospitals are doing to prevent SSIs?  To prevent SSIs, doctors, nurses, and other healthcare providers:  · Clean their hands and arms up to their elbows with an antiseptic agent just before the surgery.  · Clean their hands with soap and water or an alcohol-based hand rub before and after caring for each patient.  · May remove some of your hair immediately before your surgery using electric clippers if the hair is in the same area where the procedure will occur. They should not shave you with a razor.  · Wear special hair covers, masks, gowns, and gloves during surgery to keep the surgery area clean.  · Give you antibiotics before your surgery starts. In most cases, you should get antibiotics within 60 minutes before the surgery starts and the antibiotics should be stopped within 24 hours after surgery.  · Clean the skin at the site of your surgery with a special soap that kills germs.  What can I do to help prevent SSIs?  Before your surgery:  · Tell your doctor about other medical problems you may have. Health problems such as allergies, diabetes, and obesity could affect your surgery and your treatment.  · Quit smoking. Patients who smoke get more infections. Talk to your doctor about how you can quit before your surgery.  · Do not shave near where you will have surgery. Shaving with a razor can irritate your skin and make it easier to develop an  infection.  At the time of your surgery:  · Speak up if someone tries to shave you with a razor before surgery. Ask why you need to be shaved and talk with your surgeon if you have any concerns.  · Ask if you will get antibiotics before surgery.  After your surgery:  · Make sure that your healthcare providers clean their hands before examining you, either with soap and water or an alcohol-based hand rub.  · If you do not see your providers clean their hands, please ask them to do so.  · Family and friends who visit you should not touch the surgical wound or dressings.  · Family and friends should clean their hands with soap and water or an alcohol-based hand rub before and after visiting you. If you do not see them clean their hands, ask them to clean their hands.  What do I need to do when I go home from the hospital?  · Before you go home, your doctor or nurse should explain everything you need to know about taking care of your wound. Make sure you understand how to care for your wound before you leave the hospital.  · Always clean your hands before and after caring for your wound.  · Before you go home, make sure you know who to contact if you have questions or problems after you get home.  · If you have any symptoms of an infection, such as redness and pain at the surgery site, drainage, or fever, call your doctor immediately.  If you have additional questions, please ask your doctor or nurse.  Developed and co-sponsored by The Society for Healthcare Epidemiology of [place] (SHEA); Infectious Diseases Society of [place] (IDSA); American Hospital Association; Association for Professionals in Infection Control and Epidemiology (APIC); Centers for Disease Control and Prevention (CDC); and The Joint Commission.     This information is not intended to replace advice given to you by your health care provider. Make sure you discuss any questions you have with your health care provider.     Document Released: 12/23/2014  Document Revised: 01/08/2016 Document Reviewed: 03/02/2016  [employer] Interactive Patient Education ©2016 [employer]           [facility]  CHG 4% Patient Instruction Sheet    Chlorhexidine Before Surgery  Chlorhexidine gluconate (CHG) is a germ-killing (antiseptic) solution that is used to clean the skin. It gets rid of the bacteria that normally live on the skin. Cleaning your skin with CHG before surgery helps lower the risk for infection after surgery.    How to use CHG solution  · You will take 2 showers, one shower the night before surgery, the second shower the morning of surgery before coming to the hospital.  · Use CHG only as told by your health care provider, and follow the instructions on the label.  · Use CHG solution while taking a shower. Follow these steps when using CHG solution (unless your health care provider gives you different instructions):  1. Start the shower.  2. Use your normal soap and shampoo to wash your face and hair.  3. Turn off the shower or move out of the shower stream.  4. Pour the CHG onto a clean washcloth. Do not use any type of brush or rough-edged sponge.  5. Starting at your neck, lather your body down to your toes. Make sure you:  6. Pay special attention to the part of your body where you will be having surgery. Scrub this area for at least 1 minute.  7. Use the full amount of CHG as directed. Usually, this is one half bottle for each shower.  8. Do not use CHG on your head or face. If the solution gets into your ears or eyes, rinse them well with water.  9. Avoid your genital area.  10. Avoid any areas of skin that have broken skin, cuts, or scrapes.  11. Scrub your back and under your arms. Make sure to wash skin folds.  12. Let the lather sit on your skin for 1-2 minutes or as long as told by your health care  provider.  13. Thoroughly rinse your entire body in the shower. Make sure that all body creases and crevices are rinsed well.  14. Dry off with a  clean towel. Do not put any substances on your body afterward, such as powder, lotion, or perfume.  15. Put on clean clothes or pajamas.  16. If it is the night before your surgery, sleep in clean sheets.    What are the risks?  Risks of using CHG include:  · A skin reaction.  · Hearing loss, if CHG gets in your ears.  · Eye injury, if CHG gets in your eyes and is not rinsed out.  · The CHG product catching fire.  Make sure that you avoid smoking and flames after applying CHG to your skin.  Do not use CHG:  · If you have a chlorhexidine allergy or have previously reacted to chlorhexidine.  · On babies younger than 2 months of age.      On the day of surgery, when you are taken to your room in Outpatient Surgery you will be given a CHG prepackaged cloth to wipe the site for your surgery.  How to use CHG prepackaged cloths  · Follow the instructions on the label.  · Use the CHG cloth on clean, dry skin. Follow these steps when using a CHG cloth (unless your health care provider gives you different instructions):  1. Using the CHG cloth, vigorously scrub the part of your body where you will be having surgery. Scrub using a back-and-forth motion for 3 minutes. The area on your body should be completely wet with CHG when you are finished scrubbing.  2. Do not rinse. Discard the cloth and let the area air-dry for 1 minute. Do not put any substances on your body afterward, such as powder, lotion, or perfume.  Contact a health care provider if:  · Your skin gets irritated after scrubbing.  · You have questions about using your solution or cloth.  Get help right away if:  · Your eyes become very red or swollen.  · Your eyes itch badly.  · Your skin itches badly and is red or swollen.  · Your hearing changes.  · You have trouble seeing.  · You have swelling or tingling in your mouth or throat.  · You have trouble breathing.  · You swallow any chlorhexidine.  Summary  · Chlorhexidine gluconate (CHG) is a germ-killing  (antiseptic) solution that is used to clean the skin. Cleaning your skin with CHG before surgery helps lower the risk for infection after surgery.  · You may be given CHG to use at home. It may be in a bottle or in a prepackaged cloth to use on your skin. Carefully follow your health care provider's instructions and the instructions on the product label.  · Do not use CHG if you have a chlorhexidine allergy.  · Contact your health care provider if your skin gets irritated after scrubbing.  This information is not intended to replace advice given to you by your health care provider. Make sure you discuss any questions you have with your health care provider.  Document Released: 09/11/2013 Document Revised: 11/15/2018 Document Reviewed: 11/15/2018  [employer] Interactive Patient Education © 2019 [employer]          PATIENT/FAMILY/RESPONSIBLE PARTY VERBALIZES UNDERSTANDING OF ABOVE EDUCATION.  COPY OF PAIN SCALE GIVEN AND REVIEWED WITH VERBALIZED UNDERSTANDING.

## 2019-12-11 ENCOUNTER — TELEPHONE (OUTPATIENT)
Dept: VASCULAR SURGERY | Facility: CLINIC | Age: 64
End: 2019-12-11

## 2019-12-11 NOTE — TELEPHONE ENCOUNTER
Spoke with son, to see if he could get in touch with his mom. Patient cancelled surgery with no explanation.

## 2019-12-11 NOTE — TELEPHONE ENCOUNTER
Spoke with patient concerning her cancelling her surgery. This will make the 3rd time this surgery has been cancelled on the day it was scheduled. I explained how important it ws to have this surgery . Dr. Ruiz spoke with patient as well. He advised her that this surgery was very important and it needed to be done. Patient stated she was nervous, Sara explained that she would be taken care . Patient expressed understanding and apologized.  I will have Kate surgery scheduler call patient and re-schedule.

## 2019-12-11 NOTE — TELEPHONE ENCOUNTER
Patient has called and cancelled her surgery on the surgical date for the third time.  Patient had contacted me yesterday and advised that she was ready and that she would be here today for the procedure. I contacted the patient and asked what had occurred, she stated that she was nervous and scared.  Previously, we had discussed that in the pre operative area they would able to give her something to help her nerves.  Patient had spoken with both the  and Dr Ruiz this morning.  She asked about getting the procedure rescheduled and I advised that I was not sure when that would be done.  Currently he had no room until January but that I would speak with him and see what he said.  Patient was concerned about the seriousness of the issue and I and advised that it was very serious and that it needed to be taken care of but there were others who had equally serious concerns that were waiting to be scheduled as well.  Patient stated that she understood.  I then called her son.  He was very concerned that she had cancelled as well.  He asked about whether the patient could come in today later.  I advised that I would discuss with Dr. Ruiz but that it would depend on the rep that was involved and when she had eaten last.  Spoke with Dr. Ruiz nurse Maria Elena who requested that I find out when she ate last.  Patient advised that at 8 am she ate a sausage and biscuit but at 9 am had eaten a pop tart, yogurt and coffee.  Dr. Ruiz had spoken with the patient around 715 and advised her not to eat anything and they would be able to get her scheduled.  When I asked about this conversation she indicated that he had told her that but she had eaten anyway.  I told Dr. Ruiz about the last time she had eaten and he said that the surgery would have to be rescheduled.  He said that when it was rescheduled the patient's son would have to bring her and that if she cancelled again she would be dismissed as a patient.   Contacted son and advised of what Dr. Ruiz stated.  He agreed that he would bring the patient and that he understood all that had been discussed.  I rescheduled the surgery for 12/18 with an arrival time of 700 am.  Patient had her pre work completed on 12/4/2019.  Contact patient and left the surgical information on her answering machine per her request, as she had stated that she was going to lay down because she had been up all night.  Patient called me back and I advised that I had confirmed with Dr. Ruiz that she could take her Xanax before she came.  Also advised that if she cancelled this surgery that Dr. Ruiz would dismiss her as a patient.  Review all the surgical information including time, prep and location. Patient expressed understanding for all that was discussed.  I attempted to contact the son and advise of arrival time but there was no answer and no option to leave a VM.  I will continue to try and contact to advise of the arrival time.

## 2019-12-15 PROBLEM — F41.1 GENERALIZED ANXIETY DISORDER: Status: ACTIVE | Noted: 2019-12-15

## 2019-12-17 ENCOUNTER — TELEPHONE (OUTPATIENT)
Dept: VASCULAR SURGERY | Facility: CLINIC | Age: 64
End: 2019-12-17

## 2019-12-17 NOTE — TELEPHONE ENCOUNTER
Giovanni with both the patient and her son and reminded of the 700 arrival time tomorrow for her procedure with Dr. Ruiz.  Both expressed understanding for all that was discussed.

## 2019-12-17 NOTE — TELEPHONE ENCOUNTER
Spoke with patient to confirm her arrival time tomorrow for her procedure with Dr. Ruiz.  Patient confirmed that she would be here with her son at 700 am.  Patient stated that there was nothing that was going to stop her from getting her.  She advised that she was not going to take her xanax before she came.  I encouraged her that if it was something that she needed that she needed to go ahead and take it as Dr. Ruiz had already approved it. Advised patient not to hesitate to give me a call if she has any questions to call me at any time.  Patient again expressed understanding for all that was discussed. Attempted to contact patient son in reference to procedure but there was no answer and no option to leave a VM.

## 2019-12-18 ENCOUNTER — TELEPHONE (OUTPATIENT)
Dept: VASCULAR SURGERY | Facility: CLINIC | Age: 64
End: 2019-12-18

## 2019-12-18 NOTE — TELEPHONE ENCOUNTER
Patient called and stated that she was having L Sided Flank pain, bloats when she eats and has muscle spasms in her stomach that subside with she rubs it.  Patient stated that it could be her kidneys causing the flank pain.  Spoke with Stacia GOMEZ who advised that she needed to speak with her PCP in reference to those symptoms that it is not likely related to the AAA.  Advised patient of what was discussed with Stacia she expressed understanding for all that was discussed.

## 2019-12-18 NOTE — TELEPHONE ENCOUNTER
Spoke patient about her cancelling her surgery. This will be the 3rd time this surgery has been cancelled( AAA). Patient stated she had a family emergency and had to leave for [place].

## 2019-12-18 NOTE — TELEPHONE ENCOUNTER
Returned patient's call.  Patient cancelled her procedure this morning due to having to go to [place] to get her daughter from a a domestic violence occurrence.  Advised that Dr. Ruiz had stated to reschedule to January.  Advised that when I spoke with the rep to get what date he was available I would contact her and let her now.  Patient expressed understanding for all that was discussed.

## 2019-12-19 ENCOUNTER — TELEPHONE (OUTPATIENT)
Dept: VASCULAR SURGERY | Facility: CLINIC | Age: 64
End: 2019-12-19

## 2019-12-19 NOTE — TELEPHONE ENCOUNTER
Spoke with patient and advised that we had rescheduled her procedure to 1/6/2020.  Patient arrival time of 515 am.  Patient advised of location, time and prep.  Patient already completed pre work on 12/4/2019.  All information was mailed to patient at address on file.

## 2019-12-21 ENCOUNTER — RESULTS ENCOUNTER (OUTPATIENT)
Dept: UROLOGY | Facility: CLINIC | Age: 64
End: 2019-12-21

## 2019-12-21 DIAGNOSIS — N26.1 ATROPHIC KIDNEY: ICD-10-CM

## 2020-01-02 ENCOUNTER — TELEPHONE (OUTPATIENT)
Dept: VASCULAR SURGERY | Facility: CLINIC | Age: 65
End: 2020-01-02

## 2020-01-02 NOTE — TELEPHONE ENCOUNTER
Contacted patient to confirm that she did change her insurance to an Medicare Replacement Plan.  Patient confirmed this and gave me the authorization phone number off the back of the card.  Contacted Chante and received authorization for AAA.   Authorization [id number].  Advised patient.

## 2020-01-03 ENCOUNTER — TELEPHONE (OUTPATIENT)
Dept: VASCULAR SURGERY | Facility: CLINIC | Age: 65
End: 2020-01-03

## 2020-01-03 NOTE — TELEPHONE ENCOUNTER
Spoke with patient and confirmed that she would be here on Monday for her procedure with Dr. Ruiz.  Her arrival time is 515 am.  Patient expressed understanding for all that was discussed and stated that she would be here.

## 2020-01-06 ENCOUNTER — TELEPHONE (OUTPATIENT)
Dept: VASCULAR SURGERY | Facility: CLINIC | Age: 65
End: 2020-01-06

## 2020-01-06 ENCOUNTER — NURSE TRIAGE (OUTPATIENT)
Dept: CALL CENTER | Facility: HOSPITAL | Age: 65
End: 2020-01-06

## 2020-01-06 NOTE — TELEPHONE ENCOUNTER
Patient called and stated that she was told by practice manager that we would have her admitted today and then do her procedure tomorrow.  I advised that I had heard the conversation with the practice manager and that was not what was said.  Patient needs to confirm if she does have the flu, as instructed by the practice manager and then we will see if we are able to proceed.  Again stressed that I needed to know ASAP due to authorization.  Patient stated that she was going to go right away and get tested.  Advised that she needed to let me know when she found out so that I could advise Dr. Ruiz and get things scheduled.  Patient expressed understanding for all that was discussed.

## 2020-01-06 NOTE — TELEPHONE ENCOUNTER
Son Stated mother tested positive for TYPE B and would like to know if Dr. Ruiz could still do the surgery . I called [facility] and was informed of this as well. Spoke with Dr. Ruiz, AAA surgery has been cancelled until she is better.    Everyone has expressed understanding.

## 2020-01-06 NOTE — TELEPHONE ENCOUNTER
Caller states due for surgery this morning and has 102 temperature, body aches and cough. She is concerned about getting touch with MD to cancel. Lala, House Supervisor notified of patient sickness.     Reason for Disposition  • [1] Fever > 101 F (38.3 C) AND [2] age > 60    Additional Information  • Negative: Shock suspected (e.g., cold/pale/clammy skin, too weak to stand, low BP, rapid pulse)  • Negative: Difficult to awaken or acting confused (e.g., disoriented, slurred speech)  • Negative: [1] Difficulty breathing AND [2] bluish lips, tongue or face  • Negative: New onset rash with multiple purple (or blood-colored) spots or dots  • Negative: Sounds like a life-threatening emergency to the triager  • Negative: Fever in a cancer patient who is currently (or recently) receiving chemotherapy or radiation therapy, or cancer patient who has metastatic or end-stage cancer and is receiving palliative care  • Negative: Pregnant  • Negative: Postpartum (from 0 to 6 weeks after delivery)  • Negative: Fever onset within 24 hours of receiving vaccine  • Negative: [1] Fever AND [2] within 21 days of Ebola EXPOSURE  • Other symptom is present, see that guideline  (e.g., symptoms of cough, runny nose, sore throat, earache, abdominal pain, diarrhea, vomiting)  • Negative: Severe difficulty breathing (e.g., struggling for each breath, speaks in single words)  • Negative: Difficult to awaken or acting confused (e.g., disoriented, slurred speech)  • Negative: Bluish (or gray) lips or face now  • Negative: Shock suspected (e.g., cold/pale/clammy skin, too weak to stand, low BP, rapid pulse)  • Negative: Sounds like a life-threatening emergency to the triager  • Negative: [1] Sounds like a cold AND [2] no fever  • Negative: [1] Cough with sputum AND [2] no fever  • Negative: [1] Dry cough (no sputum) sputum AND [2] no fever  • Negative: [1] Throat pain AND [2] no fever  • Negative: Influenza vaccine reaction is suspected  •  "Negative: Chest pain  (Exception: MILD central chest pain, present only when coughing)  • Negative: [1] Headache AND [2] stiff neck (can't touch chin to chest)  • Negative: Fever > 104 F (40 C)  • Negative: [1] Difficulty breathing AND [2] not severe AND [3] not from stuffy nose (e.g., not relieved by cleaning out the nose)  • Negative: Patient sounds very sick or weak to the triager    Answer Assessment - Initial Assessment Questions  1. TEMPERATURE: \"What is the most recent temperature?\"  \"How was it measured?\"       Yesterday 102  2. ONSET: \"When did the fever start?\"       Yesterday   3. SYMPTOMS: \"Do you have any other symptoms besides the fever?\"  (e.g., colds, headache, sore throat, earache, cough, rash, diarrhea, vomiting, abdominal pain)      Headache, cough, fever and aches all over  4. CAUSE: If there are no symptoms, ask: \"What do you think is causing the fever?\"       I think I have the flu   5. CONTACTS: \"Does anyone else in the family have an infection?\"      All the family as   6. TREATMENT: \"What have you done so far to treat this fever?\" (e.g., medications)        Took Tylenol earlier   7. IMMUNOCOMPROMISE: \"Do you have of the following: diabetes, HIV positive, splenectomy, cancer chemotherapy, chronic steroid treatment, transplant patient, etc.\"      Diabetic   8. PREGNANCY: \"Is there any chance you are pregnant?\" \"When was your last menstrual period?\"      N/a   9. TRAVEL: \"Have you traveled out of the country in the last month?\" (e.g., travel history, exposures)      Denies    Answer Assessment - Initial Assessment Questions  1. WORST SYMPTOM: \"What is your worst symptom?\" (e.g., cough, runny nose, muscle aches, headache, sore throat, fever)       Aches all over, fever, headache and cough   2. ONSET: \"When did your flu symptoms start?\"       Yesterday   3. COUGH: \"How bad is the cough?\"         Mild cough   4. RESPIRATORY DISTRESS: \"Describe your breathing.\"       Denies   5. FEVER: \"Do you have " "a fever?\" If so, ask: \"What is your temperature, how was it measured, and when did it start?\"      102  6. EXPOSURE: \"Were you exposed to someone with influenza?\"        Family with flu   7. FLU VACCINE: \"Did you get a flu shot this year?\"      Yes   8. HIGH RISK DISEASE: \"Do you any chronic medical problems?\" (e.g., heart or lung disease, asthma, weak immune system, or other HIGH RISK conditions)      COPD   9. PREGNANCY: \"Is there any chance you are pregnant?\" \"When was your last menstrual period?\"      n/a  10. OTHER SYMPTOMS: \"Do you have any other symptoms?\"  (e.g., runny nose, muscle aches, headache, sore throat)       Cough, muscle aches, headache    Protocols used: INFLUENZA - SEASONAL-ADULT-, FEVER-ADULT-AH      "

## 2020-01-06 NOTE — TELEPHONE ENCOUNTER
Left message for patient to see if she had been able to make an appointment with her PCP. Advised that she would also be able to go to an Urgent Care to get tested to see if she had the flu.  Requested that she return my call and left all contact information for patient to return my call.

## 2020-01-06 NOTE — TELEPHONE ENCOUNTER
Patient returned my call and stated that she had driven to her PCP and was advised that they did not have any cultures available to conduct the flu test.  Advised that patient could also go to an UC.  Patient stated that she would call me back when she found something out.

## 2020-01-06 NOTE — TELEPHONE ENCOUNTER
Patient returned our call concerning her cancelling her surgery for the 3rd time. Patient stated she had the flu. I asked her if I could call and get the office note from where Dr. Bullock had diagnosed her with the flu. Patient then stated she didn't go to the Dr. But she knows she has it.   I explained that Dylon, AAA graft rep., has came three times for her surgery and she has cancelled each time. Patient stated she was sorry.   I stated that Dr. Ruiz said he would direct admit her tonight if she would go to her PCP and make sure she didn't have the flu.    Patient expressed understanding. Stated she would go and let us know.

## 2020-01-14 ENCOUNTER — TELEPHONE (OUTPATIENT)
Dept: VASCULAR SURGERY | Facility: CLINIC | Age: 65
End: 2020-01-14

## 2020-01-14 NOTE — TELEPHONE ENCOUNTER
Spoke with both patient and son.  Advised that the patient will be admitted to the hospital on 1/21/2020 for her procedure on 1/22/2020.  Advised per bed board that the patient will be contacted after noon when the bed was ready for her to be admitted.  Patient surgery will then be on 1/22/2020.  Both patient and son expressed understanding for all that was discussed.  The authorization has been updated to reflect DOS 1/21/2020 - 1/27/2020.  Auth number [id number], Ref number [id number] per Priscilla REBOLLEDO

## 2020-01-21 ENCOUNTER — TELEPHONE (OUTPATIENT)
Dept: VASCULAR SURGERY | Facility: CLINIC | Age: 65
End: 2020-01-21

## 2020-01-21 ENCOUNTER — HOSPITAL ENCOUNTER (INPATIENT)
Facility: HOSPITAL | Age: 65
LOS: 2 days | Discharge: HOME | End: 2020-01-23
Attending: SURGERY | Admitting: SURGERY

## 2020-01-21 DIAGNOSIS — Z01.818 PREOP TESTING: ICD-10-CM

## 2020-01-21 DIAGNOSIS — I71.40 ABDOMINAL AORTIC ANEURYSM (AAA) WITHOUT RUPTURE (HCC): ICD-10-CM

## 2020-01-21 LAB
ABO GROUP BLD: NORMAL
ANION GAP SERPL CALCULATED.3IONS-SCNC: 12 MMOL/L (ref 5–15)
APTT PPP: 33.7 SECONDS (ref 24.1–35)
BASOPHILS # BLD AUTO: 0.06 10*3/MM3 (ref 0–0.2)
BASOPHILS NFR BLD AUTO: 0.8 % (ref 0–1.5)
BLD GP AB SCN SERPL QL: NEGATIVE
BUN BLD-MCNC: 9 MG/DL (ref 8–23)
BUN/CREAT SERPL: 11.7 (ref 7–25)
CALCIUM SPEC-SCNC: 9.3 MG/DL (ref 8.6–10.5)
CHLORIDE SERPL-SCNC: 98 MMOL/L (ref 98–107)
CO2 SERPL-SCNC: 29 MMOL/L (ref 22–29)
CREAT BLD-MCNC: 0.77 MG/DL (ref 0.57–1)
DEPRECATED RDW RBC AUTO: 40 FL (ref 37–54)
EOSINOPHIL # BLD AUTO: 0.29 10*3/MM3 (ref 0–0.4)
EOSINOPHIL NFR BLD AUTO: 4.1 % (ref 0.3–6.2)
ERYTHROCYTE [DISTWIDTH] IN BLOOD BY AUTOMATED COUNT: 11.6 % (ref 12.3–15.4)
GFR SERPL CREATININE-BSD FRML MDRD: 75 ML/MIN/1.73
GLUCOSE BLD-MCNC: 262 MG/DL (ref 65–99)
HCT VFR BLD AUTO: 48.8 % (ref 34–46.6)
HGB BLD-MCNC: 16.8 G/DL (ref 12–15.9)
IMM GRANULOCYTES # BLD AUTO: 0.02 10*3/MM3 (ref 0–0.05)
IMM GRANULOCYTES NFR BLD AUTO: 0.3 % (ref 0–0.5)
INR PPP: 1 (ref 0.91–1.09)
LYMPHOCYTES # BLD AUTO: 2.16 10*3/MM3 (ref 0.7–3.1)
LYMPHOCYTES NFR BLD AUTO: 30.3 % (ref 19.6–45.3)
MCH RBC QN AUTO: 32.6 PG (ref 26.6–33)
MCHC RBC AUTO-ENTMCNC: 34.4 G/DL (ref 31.5–35.7)
MCV RBC AUTO: 94.8 FL (ref 79–97)
MONOCYTES # BLD AUTO: 0.55 10*3/MM3 (ref 0.1–0.9)
MONOCYTES NFR BLD AUTO: 7.7 % (ref 5–12)
NEUTROPHILS # BLD AUTO: 4.06 10*3/MM3 (ref 1.7–7)
NEUTROPHILS NFR BLD AUTO: 56.8 % (ref 42.7–76)
NRBC BLD AUTO-RTO: 0 /100 WBC (ref 0–0.2)
PLATELET # BLD AUTO: 234 10*3/MM3 (ref 140–450)
PMV BLD AUTO: 9.1 FL (ref 6–12)
POTASSIUM BLD-SCNC: 4.9 MMOL/L (ref 3.5–5.2)
PROTHROMBIN TIME: 13.5 SECONDS (ref 11.9–14.6)
RBC # BLD AUTO: 5.15 10*6/MM3 (ref 3.77–5.28)
RH BLD: POSITIVE
SODIUM BLD-SCNC: 139 MMOL/L (ref 136–145)
T&S EXPIRATION DATE: NORMAL
WBC NRBC COR # BLD: 7.14 10*3/MM3 (ref 3.4–10.8)

## 2020-01-21 PROCEDURE — 86850 RBC ANTIBODY SCREEN: CPT | Performed by: SURGERY

## 2020-01-21 PROCEDURE — 86900 BLOOD TYPING SEROLOGIC ABO: CPT | Performed by: SURGERY

## 2020-01-21 PROCEDURE — 85025 COMPLETE CBC W/AUTO DIFF WBC: CPT | Performed by: SURGERY

## 2020-01-21 PROCEDURE — 85610 PROTHROMBIN TIME: CPT | Performed by: SURGERY

## 2020-01-21 PROCEDURE — 94799 UNLISTED PULMONARY SVC/PX: CPT

## 2020-01-21 PROCEDURE — 85730 THROMBOPLASTIN TIME PARTIAL: CPT | Performed by: SURGERY

## 2020-01-21 PROCEDURE — 99406 BEHAV CHNG SMOKING 3-10 MIN: CPT

## 2020-01-21 PROCEDURE — 63710000001 DIPHENHYDRAMINE PER 50 MG: Performed by: NURSE PRACTITIONER

## 2020-01-21 PROCEDURE — 94760 N-INVAS EAR/PLS OXIMETRY 1: CPT

## 2020-01-21 PROCEDURE — 80048 BASIC METABOLIC PNL TOTAL CA: CPT | Performed by: SURGERY

## 2020-01-21 PROCEDURE — 86901 BLOOD TYPING SEROLOGIC RH(D): CPT | Performed by: SURGERY

## 2020-01-21 RX ORDER — ONDANSETRON 4 MG/1
4 TABLET, FILM COATED ORAL EVERY 6 HOURS PRN
Status: DISCONTINUED | OUTPATIENT
Start: 2020-01-21 | End: 2020-01-23 | Stop reason: HOSPADM

## 2020-01-21 RX ORDER — ONDANSETRON 2 MG/ML
4 INJECTION INTRAMUSCULAR; INTRAVENOUS EVERY 6 HOURS PRN
Status: DISCONTINUED | OUTPATIENT
Start: 2020-01-21 | End: 2020-01-23 | Stop reason: HOSPADM

## 2020-01-21 RX ORDER — CARVEDILOL 25 MG/1
25 TABLET ORAL 2 TIMES DAILY WITH MEALS
Status: DISCONTINUED | OUTPATIENT
Start: 2020-01-21 | End: 2020-01-23 | Stop reason: HOSPADM

## 2020-01-21 RX ORDER — CITALOPRAM 20 MG/1
20 TABLET ORAL DAILY
Status: DISCONTINUED | OUTPATIENT
Start: 2020-01-21 | End: 2020-01-23 | Stop reason: HOSPADM

## 2020-01-21 RX ORDER — NICOTINE 21 MG/24HR
1 PATCH, TRANSDERMAL 24 HOURS TRANSDERMAL
Status: DISCONTINUED | OUTPATIENT
Start: 2020-01-21 | End: 2020-01-23 | Stop reason: HOSPADM

## 2020-01-21 RX ORDER — SODIUM CHLORIDE 0.9 % (FLUSH) 0.9 %
10 SYRINGE (ML) INJECTION EVERY 12 HOURS SCHEDULED
Status: DISCONTINUED | OUTPATIENT
Start: 2020-01-21 | End: 2020-01-23 | Stop reason: HOSPADM

## 2020-01-21 RX ORDER — ATORVASTATIN CALCIUM 10 MG/1
20 TABLET, FILM COATED ORAL NIGHTLY
Status: DISCONTINUED | OUTPATIENT
Start: 2020-01-21 | End: 2020-01-23 | Stop reason: HOSPADM

## 2020-01-21 RX ORDER — LORAZEPAM 2 MG/ML
1 INJECTION INTRAMUSCULAR EVERY 4 HOURS PRN
Status: DISCONTINUED | OUTPATIENT
Start: 2020-01-21 | End: 2020-01-23 | Stop reason: HOSPADM

## 2020-01-21 RX ORDER — SODIUM CHLORIDE 9 MG/ML
100 INJECTION, SOLUTION INTRAVENOUS CONTINUOUS
Status: DISCONTINUED | OUTPATIENT
Start: 2020-01-21 | End: 2020-01-22

## 2020-01-21 RX ORDER — AMLODIPINE BESYLATE 10 MG/1
10 TABLET ORAL
Status: DISCONTINUED | OUTPATIENT
Start: 2020-01-21 | End: 2020-01-23 | Stop reason: HOSPADM

## 2020-01-21 RX ORDER — ALPRAZOLAM 0.5 MG/1
0.5 TABLET ORAL
Status: DISCONTINUED | OUTPATIENT
Start: 2020-01-21 | End: 2020-01-23 | Stop reason: HOSPADM

## 2020-01-21 RX ORDER — DIPHENHYDRAMINE HCL 25 MG
25 CAPSULE ORAL NIGHTLY PRN
Status: DISCONTINUED | OUTPATIENT
Start: 2020-01-21 | End: 2020-01-23 | Stop reason: HOSPADM

## 2020-01-21 RX ORDER — SODIUM CHLORIDE 0.9 % (FLUSH) 0.9 %
10 SYRINGE (ML) INJECTION AS NEEDED
Status: DISCONTINUED | OUTPATIENT
Start: 2020-01-21 | End: 2020-01-23 | Stop reason: HOSPADM

## 2020-01-21 RX ORDER — ACETAMINOPHEN 325 MG/1
650 TABLET ORAL NIGHTLY PRN
Status: DISCONTINUED | OUTPATIENT
Start: 2020-01-21 | End: 2020-01-23 | Stop reason: HOSPADM

## 2020-01-21 RX ORDER — ASPIRIN 81 MG/1
81 TABLET, CHEWABLE ORAL DAILY
Status: DISCONTINUED | OUTPATIENT
Start: 2020-01-21 | End: 2020-01-23 | Stop reason: HOSPADM

## 2020-01-21 RX ADMIN — ACETAMINOPHEN 650 MG: 325 TABLET, FILM COATED ORAL at 20:05

## 2020-01-21 RX ADMIN — SODIUM CHLORIDE 100 ML/HR: 9 INJECTION, SOLUTION INTRAVENOUS at 23:42

## 2020-01-21 RX ADMIN — SODIUM CHLORIDE, PRESERVATIVE FREE 10 ML: 5 INJECTION INTRAVENOUS at 13:47

## 2020-01-21 RX ADMIN — DIPHENHYDRAMINE HYDROCHLORIDE 25 MG: 25 CAPSULE ORAL at 20:05

## 2020-01-21 RX ADMIN — SODIUM CHLORIDE 100 ML/HR: 9 INJECTION, SOLUTION INTRAVENOUS at 13:47

## 2020-01-21 RX ADMIN — METFORMIN HYDROCHLORIDE 500 MG: 500 TABLET ORAL at 18:17

## 2020-01-21 RX ADMIN — CARVEDILOL 25 MG: 25 TABLET, FILM COATED ORAL at 18:17

## 2020-01-21 NOTE — H&P
Robert Daniels  [id number]  [id number]  [birth year]  [place]ass[place] jamil,   2022    Chief Complaint: Abdominal aortic aneurysm    HPI: Robert Daniels is a patient well-known to us.  We do follow-up for abdominal aortic aneurysm.  She does also have a history of chronic back pain with lower extremity radiculopathy.  She has been scheduled multiple times for aneurysm repair of her 4.7 cm saccular abdominal aortic aneurysm.  She is very anxious.  Unfortunately she does continue to smoke.  She will undergo endovascular abdominal aortic aneurysm repair tomorrow.    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    • Cervical cancer (CMS/HCC)    • COPD (chronic obstructive pulmonary disease) (CMS/HCC)    • Depression    • Diabetes mellitus (CMS/HCC)    • Generalized anxiety disorder 12/15/2019   • Hx of degenerative disc disease    • Hypertension    • Panic attacks    • Personal history of nicotine dependence 2019   • PONV (postoperative nausea and vomiting)    • Preoperative respiratory examination 2019    Added automatically from request for surgery [id number]   • Shortness of breath    • Tobacco abuse        Past Surgical History:   Procedure Laterality Date   • CERVICAL CONE BIOPSY     •  SECTION     • HYSTERECTOMY      Due to cancer.  [facility]   • OOPHORECTOMY         Family History   Problem Relation Age of Onset   • Breast cancer Sister         Unknown   • Hypertension Mother    • Heart disease Mother    • Cancer Mother    • Heart attack Brother    • Heart attack Brother    • Heart attack Brother    • Heart attack Brother    • Colon cancer Neg Hx    • Ovarian cancer Neg Hx        Social History     Socioeconomic History   • Marital status: Legally      Spouse name: Not on file   • Number of children: Not on file   •  Years of education: Not on file   • Highest education level: Not on file   Tobacco Use   • Smoking status: Current Every Day Smoker     Packs/day: 1.00     Types: Cigarettes   • Smokeless tobacco: Never Used   • Tobacco comment: Cutting back but not ready to quit.   Substance and Sexual Activity   • Alcohol use: No   • Drug use: No   • Sexual activity: Defer     Birth control/protection: Surgical       Allergies   Allergen Reactions   • Pravachol [Pravastatin Sodium] Myalgia       Medications Prior to Admission   Medication Sig Dispense Refill Last Dose   • albuterol (PROVENTIL HFA;VENTOLIN HFA) 108 (90 Base) MCG/ACT inhaler Inhale 2 puffs Every 6 (Six) Hours As Needed for Wheezing.   Taking   • ALPRAZolam (XANAX) 1 MG tablet Take 0.5 mg by mouth Daily With Lunch.   Taking   • amLODIPine (NORVASC) 10 MG tablet Take 10 mg by mouth Daily.   Taking   • aspirin 81 MG EC tablet Take 81 mg by mouth Daily.   Taking   • carvedilol (COREG) 25 MG tablet Take 25 mg by mouth 2 (Two) Times a Day With Meals. Takes with lunch and supper   Taking   • citalopram (CeleXA) 40 MG tablet Take 20 mg by mouth Daily.   Taking   • Diphenhydramine-APAP, sleep, (TYLENOL PM EXTRA STRENGTH PO) Take 2 tablets by mouth Every Night.   Taking   • KRILL OIL PO Take 500 mg by mouth Daily.   Taking   • metFORMIN (GLUCOPHAGE) 500 MG tablet Take 500 mg by mouth 2 (Two) Times a Day With Meals.   Taking   • oxyCODONE-acetaminophen (PERCOCET) 7.5-325 MG per tablet Take 1 tablet by mouth Every 6 (Six) Hours As Needed for Severe Pain .   Taking   • simvastatin (ZOCOR) 40 MG tablet Take 40 mg by mouth Every Night.   Taking       Review of Systems   Constitutional: Negative.    HENT: Negative.    Eyes: Negative.    Respiratory: Negative.    Cardiovascular: Negative.    Gastrointestinal: Negative.    Endocrine: Negative.    Genitourinary: Negative.    Musculoskeletal: Positive for back pain.   Skin: Negative.    Allergic/Immunologic: Negative.    Neurological:  "Negative.    Hematological: Negative.    Psychiatric/Behavioral: The patient is nervous/anxious.    All other systems reviewed and are negative.      /72 (BP Location: Left arm, Patient Position: Sitting)   Pulse 66   Temp 98.4 °F (36.9 °C)   Resp 16   Ht 172.7 cm (68\")   Wt 70.3 kg (155 lb)   SpO2 95%   BMI 23.57 kg/m²   Physical Exam   Constitutional: She is oriented to person, place, and time. She appears well-developed and well-nourished.   HENT:   Head: Normocephalic and atraumatic.   Eyes: Pupils are equal, round, and reactive to light. No scleral icterus.   Neck: Neck supple. No JVD present. Carotid bruit is not present. No thyromegaly present.   Cardiovascular: Normal rate, regular rhythm and normal heart sounds.   Pulses:       Carotid pulses are 2+ on the right side, and 2+ on the left side.       Femoral pulses are 2+ on the right side, and 2+ on the left side.       Popliteal pulses are 2+ on the right side, and 2+ on the left side.        Dorsalis pedis pulses are 2+ on the right side, and 2+ on the left side.        Posterior tibial pulses are 2+ on the right side, and 2+ on the left side.   Pulmonary/Chest: Effort normal and breath sounds normal.   Abdominal: Soft. Bowel sounds are normal. She exhibits pulsatile midline mass. She exhibits no distension, no abdominal bruit and no mass. There is no hepatosplenomegaly. There is no tenderness.   Musculoskeletal: Normal range of motion. She exhibits no edema.   Lymphadenopathy:     She has no cervical adenopathy.   Neurological: She is alert and oriented to person, place, and time. She has normal strength. No cranial nerve deficit or sensory deficit.   Skin: Skin is warm, dry and intact.   Psychiatric: She has a normal mood and affect.   Nursing note and vitals reviewed.      Lab Results (last 7 days)     ** No results found for the last 168 hours. **          Imaging Results (Last 7 Days)     ** No results found for the last 168 hours. **    "       Impression:  1. Abdominal aortic aneurysm  2. Tobacco abuse  3. Chronic back pain  4. Anxiety    Plan:  Admission orders placed.  We will proceed with endovascular abdominal aortic aneurysm repair tomorrow.  Risks/benefits were explained at great length to the patient which include but are not limited to bleeding, infection, vessel rupture, bowel damage, renal failure, MI, stroke, and death.  I will order nicotine patch if she would use while hospitalized.  I will continue home medications, which include pain medication and anxiety medication.  NPO after midnight and labs to be drawn.        Daniel Ruiz,   1/21/2020  12:43 PM

## 2020-01-21 NOTE — TELEPHONE ENCOUNTER
Spoke with patient who advised that the hospital had called her and that she was checking into the hospital at noon.  She will be preped for her procedure scheduled for tomorrow.

## 2020-01-21 NOTE — PLAN OF CARE
Problem: Patient Care Overview  Goal: Plan of Care Review  Outcome: Ongoing (interventions implemented as appropriate)  Flowsheets (Taken 1/21/2020 9509)  Progress: no change  Plan of Care Reviewed With: patient  Outcome Summary: Pt admitted for hydration prior to AAA repair tomorrow. IVF infusing. Up ad sue. Voiding. vitals stable. will cont to monitor.

## 2020-01-22 ENCOUNTER — ANESTHESIA EVENT (OUTPATIENT)
Dept: PERIOP | Facility: HOSPITAL | Age: 65
End: 2020-01-22

## 2020-01-22 ENCOUNTER — APPOINTMENT (OUTPATIENT)
Dept: INTERVENTIONAL RADIOLOGY/VASCULAR | Facility: HOSPITAL | Age: 65
End: 2020-01-22

## 2020-01-22 ENCOUNTER — ANESTHESIA (OUTPATIENT)
Dept: PERIOP | Facility: HOSPITAL | Age: 65
End: 2020-01-22

## 2020-01-22 LAB
A-A DO2: 43.3 MMHG
ARTERIAL PATENCY WRIST A: ABNORMAL
ATMOSPHERIC PRESS: 757 MMHG
BASE EXCESS BLDA CALC-SCNC: 2.4 MMOL/L (ref 0–2)
BDY SITE: ABNORMAL
BODY TEMPERATURE: 37 C
COHGB MFR BLD: 6.3 % (ref 0–5)
GLUCOSE BLDC GLUCOMTR-MCNC: 151 MG/DL (ref 70–130)
GLUCOSE BLDC GLUCOMTR-MCNC: 167 MG/DL (ref 70–130)
HCO3 BLDA-SCNC: 27.9 MMOL/L (ref 20–26)
HCT VFR BLD CALC: 50.9 % (ref 38–51)
HGB BLDA-MCNC: 16.6 G/DL (ref 12–16)
Lab: ABNORMAL
METHGB BLD QL: 0.7 % (ref 0–3)
MODALITY: ABNORMAL
NOTE: ABNORMAL
OXYHGB MFR BLDV: 85.8 % (ref 94–99)
PCO2 BLDA: 44.9 MM HG (ref 35–45)
PCO2 TEMP ADJ BLD: ABNORMAL MM[HG]
PH BLDA: 7.4 PH UNITS (ref 7.35–7.45)
PH, TEMP CORRECTED: ABNORMAL
PO2 BLDA: 55.2 MM HG (ref 83–108)
PO2 TEMP ADJ BLD: ABNORMAL MM[HG]
POTASSIUM BLDA-SCNC: 4 MMOL/L (ref 3.5–5.2)
SAO2 % BLDCOA: 92.2 % (ref 94–99)
SODIUM BLDA-SCNC: 138 MMOL/L (ref 136–145)
VENTILATOR MODE: ABNORMAL

## 2020-01-22 PROCEDURE — B41D1ZZ FLUOROSCOPY OF AORTA AND BILATERAL LOWER EXTREMITY ARTERIES USING LOW OSMOLAR CONTRAST: ICD-10-PCS | Performed by: SURGERY

## 2020-01-22 PROCEDURE — 82962 GLUCOSE BLOOD TEST: CPT

## 2020-01-22 PROCEDURE — 25010000002 MIDAZOLAM PER 1 MG: Performed by: ANESTHESIOLOGY

## 2020-01-22 PROCEDURE — 04V03DZ RESTRICTION OF ABDOMINAL AORTA WITH INTRALUMINAL DEVICE, PERCUTANEOUS APPROACH: ICD-10-PCS | Performed by: SURGERY

## 2020-01-22 PROCEDURE — C1769 GUIDE WIRE: HCPCS | Performed by: SURGERY

## 2020-01-22 PROCEDURE — C1894 INTRO/SHEATH, NON-LASER: HCPCS | Performed by: SURGERY

## 2020-01-22 PROCEDURE — 94799 UNLISTED PULMONARY SVC/PX: CPT

## 2020-01-22 PROCEDURE — C1725 CATH, TRANSLUMIN NON-LASER: HCPCS | Performed by: SURGERY

## 2020-01-22 PROCEDURE — 83050 HGB METHEMOGLOBIN QUAN: CPT

## 2020-01-22 PROCEDURE — 25010000002 NEOSTIGMINE 10 MG/10ML SOLUTION 10 ML VIAL: Performed by: NURSE ANESTHETIST, CERTIFIED REGISTERED

## 2020-01-22 PROCEDURE — 25010000002 LORAZEPAM PER 2 MG: Performed by: NURSE PRACTITIONER

## 2020-01-22 PROCEDURE — 34705 EVAC RPR A-BIILIAC NDGFT: CPT | Performed by: SURGERY

## 2020-01-22 PROCEDURE — 76000 FLUOROSCOPY <1 HR PHYS/QHP: CPT

## 2020-01-22 PROCEDURE — 25010000002 ONDANSETRON PER 1 MG: Performed by: NURSE ANESTHETIST, CERTIFIED REGISTERED

## 2020-01-22 PROCEDURE — 25010000002 DEXAMETHASONE PER 1 MG: Performed by: ANESTHESIOLOGY

## 2020-01-22 PROCEDURE — 25010000002 FENTANYL CITRATE (PF) 100 MCG/2ML SOLUTION: Performed by: NURSE ANESTHETIST, CERTIFIED REGISTERED

## 2020-01-22 PROCEDURE — 25010000002 IOPAMIDOL 61 % SOLUTION: Performed by: SURGERY

## 2020-01-22 PROCEDURE — 34812 OPN FEM ART EXPOS: CPT | Performed by: SURGERY

## 2020-01-22 PROCEDURE — 82375 ASSAY CARBOXYHB QUANT: CPT

## 2020-01-22 PROCEDURE — 047C3ZZ DILATION OF RIGHT COMMON ILIAC ARTERY, PERCUTANEOUS APPROACH: ICD-10-PCS | Performed by: SURGERY

## 2020-01-22 PROCEDURE — C1760 CLOSURE DEV, VASC: HCPCS | Performed by: SURGERY

## 2020-01-22 PROCEDURE — C1887 CATHETER, GUIDING: HCPCS | Performed by: SURGERY

## 2020-01-22 PROCEDURE — 25010000002 MORPHINE SULFATE (PF) 2 MG/ML SOLUTION: Performed by: SURGERY

## 2020-01-22 PROCEDURE — 25010000002 PROPOFOL 10 MG/ML EMULSION: Performed by: NURSE ANESTHETIST, CERTIFIED REGISTERED

## 2020-01-22 PROCEDURE — 82805 BLOOD GASES W/O2 SATURATION: CPT

## 2020-01-22 PROCEDURE — 25010000002 HEPARIN (PORCINE) PER 1000 UNITS: Performed by: NURSE ANESTHETIST, CERTIFIED REGISTERED

## 2020-01-22 PROCEDURE — 34713 PERQ ACCESS & CLSR FEM ART: CPT | Performed by: SURGERY

## 2020-01-22 PROCEDURE — 25010000002 HEPARIN (PORCINE) PER 1000 UNITS: Performed by: SURGERY

## 2020-01-22 DEVICE — STENTGR AAA ENDURANT2 LW 14F 16X13X93MM: Type: IMPLANTABLE DEVICE | Site: AORTA | Status: FUNCTIONAL

## 2020-01-22 DEVICE — STENTGR AAA ENDURANT2 BIFUR 20F 32X14X103MM: Type: IMPLANTABLE DEVICE | Site: AORTA | Status: FUNCTIONAL

## 2020-01-22 RX ORDER — MIDAZOLAM HYDROCHLORIDE 1 MG/ML
1 INJECTION INTRAMUSCULAR; INTRAVENOUS
Status: DISCONTINUED | OUTPATIENT
Start: 2020-01-22 | End: 2020-01-22 | Stop reason: HOSPADM

## 2020-01-22 RX ORDER — MORPHINE SULFATE 2 MG/ML
2 INJECTION, SOLUTION INTRAMUSCULAR; INTRAVENOUS
Status: DISCONTINUED | OUTPATIENT
Start: 2020-01-22 | End: 2020-01-22 | Stop reason: HOSPADM

## 2020-01-22 RX ORDER — ROCURONIUM BROMIDE 10 MG/ML
INJECTION, SOLUTION INTRAVENOUS AS NEEDED
Status: DISCONTINUED | OUTPATIENT
Start: 2020-01-22 | End: 2020-01-22 | Stop reason: SURG

## 2020-01-22 RX ORDER — SODIUM CHLORIDE 0.9 % (FLUSH) 0.9 %
3 SYRINGE (ML) INJECTION EVERY 12 HOURS SCHEDULED
Status: DISCONTINUED | OUTPATIENT
Start: 2020-01-22 | End: 2020-01-22 | Stop reason: HOSPADM

## 2020-01-22 RX ORDER — ASPIRIN 81 MG/1
81 TABLET ORAL DAILY
Status: DISCONTINUED | OUTPATIENT
Start: 2020-01-22 | End: 2020-01-23 | Stop reason: HOSPADM

## 2020-01-22 RX ORDER — ATORVASTATIN CALCIUM 10 MG/1
20 TABLET, FILM COATED ORAL DAILY
Status: DISCONTINUED | OUTPATIENT
Start: 2020-01-22 | End: 2020-01-22 | Stop reason: SDUPTHER

## 2020-01-22 RX ORDER — ACETAMINOPHEN 500 MG
1000 TABLET ORAL ONCE
Status: COMPLETED | OUTPATIENT
Start: 2020-01-22 | End: 2020-01-22

## 2020-01-22 RX ORDER — BUPIVACAINE HCL/0.9 % NACL/PF 0.1 %
2 PLASTIC BAG, INJECTION (ML) EPIDURAL ONCE
Status: COMPLETED | OUTPATIENT
Start: 2020-01-22 | End: 2020-01-22

## 2020-01-22 RX ORDER — LIDOCAINE HYDROCHLORIDE 10 MG/ML
0.5 INJECTION, SOLUTION EPIDURAL; INFILTRATION; INTRACAUDAL; PERINEURAL ONCE AS NEEDED
Status: DISCONTINUED | OUTPATIENT
Start: 2020-01-22 | End: 2020-01-22 | Stop reason: HOSPADM

## 2020-01-22 RX ORDER — AMLODIPINE BESYLATE 10 MG/1
10 TABLET ORAL DAILY
Status: DISCONTINUED | OUTPATIENT
Start: 2020-01-22 | End: 2020-01-23 | Stop reason: HOSPADM

## 2020-01-22 RX ORDER — FLUMAZENIL 0.1 MG/ML
0.2 INJECTION INTRAVENOUS AS NEEDED
Status: DISCONTINUED | OUTPATIENT
Start: 2020-01-22 | End: 2020-01-22 | Stop reason: HOSPADM

## 2020-01-22 RX ORDER — ONDANSETRON 4 MG/1
4 TABLET, FILM COATED ORAL EVERY 6 HOURS PRN
Status: DISCONTINUED | OUTPATIENT
Start: 2020-01-22 | End: 2020-01-23 | Stop reason: HOSPADM

## 2020-01-22 RX ORDER — HYDRALAZINE HYDROCHLORIDE 20 MG/ML
5 INJECTION INTRAMUSCULAR; INTRAVENOUS
Status: DISCONTINUED | OUTPATIENT
Start: 2020-01-22 | End: 2020-01-22 | Stop reason: HOSPADM

## 2020-01-22 RX ORDER — NALOXONE HCL 0.4 MG/ML
0.04 VIAL (ML) INJECTION AS NEEDED
Status: DISCONTINUED | OUTPATIENT
Start: 2020-01-22 | End: 2020-01-22 | Stop reason: HOSPADM

## 2020-01-22 RX ORDER — MIDAZOLAM HYDROCHLORIDE 1 MG/ML
2 INJECTION INTRAMUSCULAR; INTRAVENOUS
Status: DISCONTINUED | OUTPATIENT
Start: 2020-01-22 | End: 2020-01-22 | Stop reason: HOSPADM

## 2020-01-22 RX ORDER — BUPIVACAINE HCL/0.9 % NACL/PF 0.1 %
2 PLASTIC BAG, INJECTION (ML) EPIDURAL EVERY 8 HOURS
Status: COMPLETED | OUTPATIENT
Start: 2020-01-22 | End: 2020-01-23

## 2020-01-22 RX ORDER — SODIUM CHLORIDE, SODIUM LACTATE, POTASSIUM CHLORIDE, CALCIUM CHLORIDE 600; 310; 30; 20 MG/100ML; MG/100ML; MG/100ML; MG/100ML
20 INJECTION, SOLUTION INTRAVENOUS CONTINUOUS
Status: DISCONTINUED | OUTPATIENT
Start: 2020-01-22 | End: 2020-01-22

## 2020-01-22 RX ORDER — ONDANSETRON 2 MG/ML
INJECTION INTRAMUSCULAR; INTRAVENOUS AS NEEDED
Status: DISCONTINUED | OUTPATIENT
Start: 2020-01-22 | End: 2020-01-22 | Stop reason: SURG

## 2020-01-22 RX ORDER — IPRATROPIUM BROMIDE AND ALBUTEROL SULFATE 2.5; .5 MG/3ML; MG/3ML
3 SOLUTION RESPIRATORY (INHALATION) ONCE
Status: COMPLETED | OUTPATIENT
Start: 2020-01-22 | End: 2020-01-22

## 2020-01-22 RX ORDER — SODIUM CHLORIDE 0.9 % (FLUSH) 0.9 %
3 SYRINGE (ML) INJECTION EVERY 12 HOURS SCHEDULED
Status: DISCONTINUED | OUTPATIENT
Start: 2020-01-22 | End: 2020-01-23 | Stop reason: HOSPADM

## 2020-01-22 RX ORDER — CARVEDILOL 25 MG/1
25 TABLET ORAL 2 TIMES DAILY WITH MEALS
Status: DISCONTINUED | OUTPATIENT
Start: 2020-01-22 | End: 2020-01-23 | Stop reason: HOSPADM

## 2020-01-22 RX ORDER — ACETAMINOPHEN 325 MG/1
650 TABLET ORAL EVERY 4 HOURS PRN
Status: DISCONTINUED | OUTPATIENT
Start: 2020-01-22 | End: 2020-01-23 | Stop reason: HOSPADM

## 2020-01-22 RX ORDER — FENTANYL CITRATE 50 UG/ML
25 INJECTION, SOLUTION INTRAMUSCULAR; INTRAVENOUS AS NEEDED
Status: DISCONTINUED | OUTPATIENT
Start: 2020-01-22 | End: 2020-01-22 | Stop reason: HOSPADM

## 2020-01-22 RX ORDER — EPHEDRINE SULFATE 50 MG/ML
INJECTION INTRAVENOUS AS NEEDED
Status: DISCONTINUED | OUTPATIENT
Start: 2020-01-22 | End: 2020-01-22 | Stop reason: SURG

## 2020-01-22 RX ORDER — LIDOCAINE HYDROCHLORIDE 40 MG/ML
SOLUTION TOPICAL AS NEEDED
Status: DISCONTINUED | OUTPATIENT
Start: 2020-01-22 | End: 2020-01-22 | Stop reason: SURG

## 2020-01-22 RX ORDER — SODIUM CHLORIDE 0.9 % (FLUSH) 0.9 %
10 SYRINGE (ML) INJECTION AS NEEDED
Status: DISCONTINUED | OUTPATIENT
Start: 2020-01-22 | End: 2020-01-23 | Stop reason: HOSPADM

## 2020-01-22 RX ORDER — ONDANSETRON 2 MG/ML
4 INJECTION INTRAMUSCULAR; INTRAVENOUS EVERY 6 HOURS PRN
Status: DISCONTINUED | OUTPATIENT
Start: 2020-01-22 | End: 2020-01-23 | Stop reason: HOSPADM

## 2020-01-22 RX ORDER — NEOSTIGMINE METHYLSULFATE 1 MG/ML
INJECTION, SOLUTION INTRAVENOUS AS NEEDED
Status: DISCONTINUED | OUTPATIENT
Start: 2020-01-22 | End: 2020-01-22 | Stop reason: SURG

## 2020-01-22 RX ORDER — NALOXONE HCL 0.4 MG/ML
0.4 VIAL (ML) INJECTION
Status: DISCONTINUED | OUTPATIENT
Start: 2020-01-22 | End: 2020-01-23 | Stop reason: HOSPADM

## 2020-01-22 RX ORDER — SODIUM CHLORIDE 0.9 % (FLUSH) 0.9 %
3-10 SYRINGE (ML) INJECTION AS NEEDED
Status: DISCONTINUED | OUTPATIENT
Start: 2020-01-22 | End: 2020-01-22 | Stop reason: HOSPADM

## 2020-01-22 RX ORDER — DEXAMETHASONE SODIUM PHOSPHATE 4 MG/ML
4 INJECTION, SOLUTION INTRA-ARTICULAR; INTRALESIONAL; INTRAMUSCULAR; INTRAVENOUS; SOFT TISSUE ONCE AS NEEDED
Status: COMPLETED | OUTPATIENT
Start: 2020-01-22 | End: 2020-01-22

## 2020-01-22 RX ORDER — ALPRAZOLAM 0.5 MG/1
0.5 TABLET ORAL 3 TIMES DAILY PRN
Status: DISCONTINUED | OUTPATIENT
Start: 2020-01-22 | End: 2020-01-23 | Stop reason: HOSPADM

## 2020-01-22 RX ORDER — PROPOFOL 10 MG/ML
VIAL (ML) INTRAVENOUS AS NEEDED
Status: DISCONTINUED | OUTPATIENT
Start: 2020-01-22 | End: 2020-01-22 | Stop reason: SURG

## 2020-01-22 RX ORDER — MORPHINE SULFATE 2 MG/ML
2 INJECTION, SOLUTION INTRAMUSCULAR; INTRAVENOUS EVERY 4 HOURS PRN
Status: DISCONTINUED | OUTPATIENT
Start: 2020-01-22 | End: 2020-01-23 | Stop reason: HOSPADM

## 2020-01-22 RX ORDER — METOCLOPRAMIDE HYDROCHLORIDE 5 MG/ML
5 INJECTION INTRAMUSCULAR; INTRAVENOUS
Status: DISCONTINUED | OUTPATIENT
Start: 2020-01-22 | End: 2020-01-22 | Stop reason: HOSPADM

## 2020-01-22 RX ORDER — OXYCODONE AND ACETAMINOPHEN 7.5; 325 MG/1; MG/1
1 TABLET ORAL EVERY 8 HOURS PRN
Status: DISCONTINUED | OUTPATIENT
Start: 2020-01-22 | End: 2020-01-22

## 2020-01-22 RX ORDER — ALBUTEROL SULFATE 2.5 MG/3ML
2.5 SOLUTION RESPIRATORY (INHALATION) EVERY 6 HOURS PRN
Status: DISCONTINUED | OUTPATIENT
Start: 2020-01-22 | End: 2020-01-23 | Stop reason: HOSPADM

## 2020-01-22 RX ORDER — HYDROCODONE BITARTRATE AND ACETAMINOPHEN 10; 325 MG/1; MG/1
1 TABLET ORAL ONCE AS NEEDED
Status: DISCONTINUED | OUTPATIENT
Start: 2020-01-22 | End: 2020-01-22 | Stop reason: HOSPADM

## 2020-01-22 RX ORDER — SODIUM CHLORIDE 9 MG/ML
75 INJECTION, SOLUTION INTRAVENOUS CONTINUOUS
Status: DISCONTINUED | OUTPATIENT
Start: 2020-01-22 | End: 2020-01-23 | Stop reason: HOSPADM

## 2020-01-22 RX ORDER — ONDANSETRON 2 MG/ML
4 INJECTION INTRAMUSCULAR; INTRAVENOUS AS NEEDED
Status: DISCONTINUED | OUTPATIENT
Start: 2020-01-22 | End: 2020-01-22 | Stop reason: HOSPADM

## 2020-01-22 RX ORDER — SODIUM CHLORIDE, SODIUM LACTATE, POTASSIUM CHLORIDE, CALCIUM CHLORIDE 600; 310; 30; 20 MG/100ML; MG/100ML; MG/100ML; MG/100ML
100 INJECTION, SOLUTION INTRAVENOUS CONTINUOUS
Status: DISCONTINUED | OUTPATIENT
Start: 2020-01-22 | End: 2020-01-22

## 2020-01-22 RX ORDER — HEPARIN SODIUM 1000 [USP'U]/ML
INJECTION, SOLUTION INTRAVENOUS; SUBCUTANEOUS AS NEEDED
Status: DISCONTINUED | OUTPATIENT
Start: 2020-01-22 | End: 2020-01-22 | Stop reason: SURG

## 2020-01-22 RX ORDER — GLYCOPYRROLATE 0.2 MG/ML
INJECTION INTRAMUSCULAR; INTRAVENOUS AS NEEDED
Status: DISCONTINUED | OUTPATIENT
Start: 2020-01-22 | End: 2020-01-22 | Stop reason: SURG

## 2020-01-22 RX ORDER — HYDROCODONE BITARTRATE AND ACETAMINOPHEN 5; 325 MG/1; MG/1
1 TABLET ORAL EVERY 8 HOURS PRN
Status: DISCONTINUED | OUTPATIENT
Start: 2020-01-22 | End: 2020-01-23 | Stop reason: HOSPADM

## 2020-01-22 RX ORDER — LABETALOL HYDROCHLORIDE 5 MG/ML
5 INJECTION, SOLUTION INTRAVENOUS
Status: DISCONTINUED | OUTPATIENT
Start: 2020-01-22 | End: 2020-01-22 | Stop reason: HOSPADM

## 2020-01-22 RX ORDER — LIDOCAINE HYDROCHLORIDE 20 MG/ML
INJECTION, SOLUTION INFILTRATION; PERINEURAL AS NEEDED
Status: DISCONTINUED | OUTPATIENT
Start: 2020-01-22 | End: 2020-01-22 | Stop reason: SURG

## 2020-01-22 RX ORDER — FENTANYL CITRATE 50 UG/ML
INJECTION, SOLUTION INTRAMUSCULAR; INTRAVENOUS AS NEEDED
Status: DISCONTINUED | OUTPATIENT
Start: 2020-01-22 | End: 2020-01-22 | Stop reason: SURG

## 2020-01-22 RX ORDER — IPRATROPIUM BROMIDE AND ALBUTEROL SULFATE 2.5; .5 MG/3ML; MG/3ML
3 SOLUTION RESPIRATORY (INHALATION) ONCE AS NEEDED
Status: COMPLETED | OUTPATIENT
Start: 2020-01-22 | End: 2020-01-22

## 2020-01-22 RX ORDER — BUPIVACAINE HYDROCHLORIDE 5 MG/ML
INJECTION, SOLUTION PERINEURAL AS NEEDED
Status: DISCONTINUED | OUTPATIENT
Start: 2020-01-22 | End: 2020-01-22 | Stop reason: HOSPADM

## 2020-01-22 RX ORDER — CITALOPRAM 20 MG/1
20 TABLET ORAL DAILY
Status: DISCONTINUED | OUTPATIENT
Start: 2020-01-22 | End: 2020-01-23 | Stop reason: HOSPADM

## 2020-01-22 RX ADMIN — SODIUM CHLORIDE, PRESERVATIVE FREE 3 ML: 5 INJECTION INTRAVENOUS at 20:29

## 2020-01-22 RX ADMIN — NEOSTIGMINE METHYLSULFATE 3 MG: 1 INJECTION INTRAVENOUS at 15:33

## 2020-01-22 RX ADMIN — FENTANYL CITRATE 100 MCG: 50 INJECTION, SOLUTION INTRAMUSCULAR; INTRAVENOUS at 14:47

## 2020-01-22 RX ADMIN — FENTANYL CITRATE 50 MCG: 50 INJECTION, SOLUTION INTRAMUSCULAR; INTRAVENOUS at 14:29

## 2020-01-22 RX ADMIN — LORAZEPAM 1 MG: 2 INJECTION INTRAMUSCULAR at 09:56

## 2020-01-22 RX ADMIN — EPHEDRINE SULFATE 10 MG: 50 INJECTION INTRAVENOUS at 14:35

## 2020-01-22 RX ADMIN — FENTANYL CITRATE 50 MCG: 50 INJECTION, SOLUTION INTRAMUSCULAR; INTRAVENOUS at 14:15

## 2020-01-22 RX ADMIN — SODIUM CHLORIDE, POTASSIUM CHLORIDE, SODIUM LACTATE AND CALCIUM CHLORIDE 100 ML/HR: 600; 310; 30; 20 INJECTION, SOLUTION INTRAVENOUS at 13:04

## 2020-01-22 RX ADMIN — IPRATROPIUM BROMIDE AND ALBUTEROL SULFATE 3 ML: 2.5; .5 SOLUTION RESPIRATORY (INHALATION) at 16:24

## 2020-01-22 RX ADMIN — MIDAZOLAM 2 MG: 1 INJECTION INTRAMUSCULAR; INTRAVENOUS at 13:01

## 2020-01-22 RX ADMIN — HEPARIN SODIUM 1000 UNITS: 1000 INJECTION, SOLUTION INTRAVENOUS; SUBCUTANEOUS at 14:34

## 2020-01-22 RX ADMIN — EPHEDRINE SULFATE 10 MG: 50 INJECTION INTRAVENOUS at 14:21

## 2020-01-22 RX ADMIN — GLYCOPYRROLATE 0.4 MG: 0.2 INJECTION, SOLUTION INTRAMUSCULAR; INTRAVENOUS at 15:33

## 2020-01-22 RX ADMIN — ROCURONIUM BROMIDE 50 MG: 10 INJECTION INTRAVENOUS at 14:16

## 2020-01-22 RX ADMIN — IPRATROPIUM BROMIDE AND ALBUTEROL SULFATE 3 ML: 2.5; .5 SOLUTION RESPIRATORY (INHALATION) at 13:01

## 2020-01-22 RX ADMIN — HYDROCODONE BITARTRATE AND ACETAMINOPHEN 1 TABLET: 5; 325 TABLET ORAL at 21:42

## 2020-01-22 RX ADMIN — SODIUM CHLORIDE 75 ML/HR: 9 INJECTION, SOLUTION INTRAVENOUS at 17:34

## 2020-01-22 RX ADMIN — Medication 2 G: at 22:15

## 2020-01-22 RX ADMIN — SODIUM CHLORIDE, POTASSIUM CHLORIDE, SODIUM LACTATE AND CALCIUM CHLORIDE 20 ML/HR: 600; 310; 30; 20 INJECTION, SOLUTION INTRAVENOUS at 13:05

## 2020-01-22 RX ADMIN — Medication 2 G: at 14:21

## 2020-01-22 RX ADMIN — ACETAMINOPHEN 1000 MG: 500 TABLET, FILM COATED ORAL at 13:01

## 2020-01-22 RX ADMIN — HEPARIN SODIUM 4000 UNITS: 1000 INJECTION, SOLUTION INTRAVENOUS; SUBCUTANEOUS at 14:40

## 2020-01-22 RX ADMIN — DEXAMETHASONE SODIUM PHOSPHATE 4 MG: 4 INJECTION, SOLUTION INTRAMUSCULAR; INTRAVENOUS at 13:01

## 2020-01-22 RX ADMIN — MORPHINE SULFATE 2 MG: 2 INJECTION, SOLUTION INTRAMUSCULAR; INTRAVENOUS at 18:34

## 2020-01-22 RX ADMIN — CARVEDILOL 25 MG: 25 TABLET, FILM COATED ORAL at 17:34

## 2020-01-22 RX ADMIN — ONDANSETRON HYDROCHLORIDE 4 MG: 2 SOLUTION INTRAMUSCULAR; INTRAVENOUS at 15:32

## 2020-01-22 RX ADMIN — LIDOCAINE HYDROCHLORIDE 1 EACH: 40 SOLUTION TOPICAL at 14:19

## 2020-01-22 RX ADMIN — ASPIRIN 81 MG: 81 TABLET ORAL at 17:34

## 2020-01-22 RX ADMIN — LIDOCAINE HYDROCHLORIDE 100 MG: 20 INJECTION, SOLUTION INFILTRATION; PERINEURAL at 14:15

## 2020-01-22 RX ADMIN — PROPOFOL 150 MG: 10 INJECTION, EMULSION INTRAVENOUS at 14:15

## 2020-01-22 NOTE — OP NOTE
Robert Daniels  1/22/2020     PREOPERATIVE DIAGNOSIS: Abdominal aortic aneurysm (AAA) without rupture (CMS/HCC) [I71.4]  Preop testing [Z01.818]     POSTOPERATIVE DIAGNOSIS: Post-Op Diagnosis Codes:     * Abdominal aortic aneurysm (AAA) without rupture (CMS/HCC) [I71.4]     * Preop testing [Z01.818]     PROCEDURE PERFORMED:   1.  Ultrasound-guided cannulation of bilateral common femoral arteries  2.  Introduction of catheter/sheath into the aorta  3.  Aortoiliac angiogram with radiographic supervision and interpretation  4.  Placement of a Medtronic endurant modular bifurcated device with 2 docking limbs measuring 32 x 14 x 103  5.  Placement of a contralateral extension limb in the left common iliac artery measuring 16 x 13 x 93  6.  Placement of an ipsilateral extension limb in the right common iliac artery measuring 16 x 13 x 93  7.  Transcatheter delivery of enhanced fixation device using the APTUS SA-85 anchors x 4 through a 22 mm guide sheath  8.  Balloon angioplasty of the graft with a Reliant balloon  9.  Balloon angioplasty of the distal right common iliac artery with a 9 x 40 mm [place] balloon  10.  Perclose closure of bilateral common femoral arteries     SURGEON: Daniel Ruiz DO      ANESTHESIA: General.    PREPARATION: Routine.    STAFF: Circulator: Maria Elena Saunders RN  Scrub Person: Edilia Ward Melissa K  Vascular Radiology Technician: Taylor Landis    Estimated Blood Loss: 50 mL    SPECIMENS: None    COMPLICATIONS: None    INDICATIONS: Robert Daniels is a 64 y.o. female who we recently saw for an abdominal aortic aneurysm.  She has a history of back problems and was having pain down both legs.  She had an incidental finding of a saccular 1.7 cm aneurysm found on a noncontrast CT.  She does smoke about 3/4 pack of cigarettes per day.  She denies any family history of aneurysms.  She is here today for endovascular abdominal aortic aneurysm repair.  The indications, risks, and  possible complications of the procedure were explained to the patient, who voiced understanding and wished to proceed with surgery.     PROCEDURE IN DETAIL:   The patient was taken to the operating room and placed on the operating table in a supine position. After general anesthesia was obtained, the abdomen and bilateral groins was prepped and draped in a sterile manner.  Under ultrasound guidance and using a micropuncture technique the right common femoral artery was cannulated and a micro-sheath was placed.  A small stab incision was made with 11 blade.  The advantage Glidewire was advanced up into the aorta under fluoroscopic guidance.  The 7 [place] sheath was placed for predilatation.  The first Perclose device was placed and deployed at the 2 o'clock position.  The second one was placed and deployed at the 10 o'clock position.  The 11 [place] sheath was placed.  Patient was given 1000 units of intravenous heparin.  The same procedure was performed in the left groin.  Under ultrasound guidance and using a micropuncture technique the left common femoral artery was cannulated and a micro-sheath was placed.  A small stab incision was made with 11 blade.  The advantage Glidewire was advanced into the aorta under fluoroscopic guidance.  The 7 [place] sheath was placed for predilatation.  The first Perclose device was placed and deployed at the 2 o'clock position.  The second was placed and deployed at the 10 o'clock position.  The 11 [place] sheath was placed.  The patient was given 6000 units of intravenous heparin.  Both flush catheters were advanced into the aorta and an aortoiliac angiogram was performed.  The appropriate measurements were taken.  The Medtronic endurant modular bifurcated device with 2 docking limbs measuring 32 x 14 x 103 was placed from the right side up to the level of the renal arteries.  A magged up view the renal arteries was performed.  The graft was then successfully deployed.  The  suprarenal stent was successfully deployed.  Next, the flush catheter was captured from the left side and the gate was successful cannulated.  The catheter was spun to ensure that we were in true lumen.  With the C-arm at 20° [place] a retrograde angiogram was performed from the left side.  The hypogastric was marked on the screen.  The contralateral limb measuring 16 x 13 x 93 was successfully placed and deployed in the left common iliac artery.  An 11 [place] sheath was then placed.  The rest of the main body device was successfully deployed.  The delivery device was captured and a 16 [place] sheath was placed.  With the C-arm at 20° [place] a retrograde angiogram was performed from the right side.  Hypogastric was marked on the screen.  The ipsilateral extension limb measuring 16 x 13 x 93 was placed successfully in the right common iliac artery.  Lastly, the Reliant balloon was used to balloon angioplasty the entire graft.  Also, a 9 x 40 mm Miller balloon was used to balloon angioplasty the distal right common iliac artery. The decision was made to perform transcatheter delivery of enhanced fixation device using the APTUS SA-85 anchors x 4 through a 22 mm guide sheath in the proximal graft. Completion angiogram was performed which showed rapid flow down through the graft and out through the iliac arteries without any evidence of stenosis or occlusion.  There was no evidence of type I, type II, type III, or type IV endoleak's.  At this point I felt no further intervention was warranted.  The sheaths were removed.  The Perclose devices were used to seal down the artery.  Direct pressure was held for an additional 10 minutes of ensure hemostasis.  5 mL of 0.5% Marcaine plain was used to infiltrate each groin for local anesthesia.  The skin was then reapproximated using a 4-0 Monocryl in a subcuticular fashion. Sterile dressings were applied. The patient tolerated the procedure well. Sponge and needle counts were correct.  The patient was then awakened and extubated in the operating room and taken to the recovery room in good condition.  Pulse exam the left leg was consistent with preoperative examination.  Daniel Ruiz,   Date: 1/22/2020 Time: 3:38 PM    CC:Titi Bullock MD

## 2020-01-22 NOTE — PLAN OF CARE
Problem: Patient Care Overview  Goal: Plan of Care Review  Outcome: Ongoing (interventions implemented as appropriate)  Flowsheets  Taken 1/22/2020 0404  Progress: no change  Outcome Summary: pt to have AAA repair later today. IVF infusing. Vss.  will continue to monitor.  Taken 1/21/2020 2010  Plan of Care Reviewed With: patient

## 2020-01-22 NOTE — PAYOR COMM NOTE
"Robert Yang (64 y.o. Female) [id number]   [facility] phone    Fax         Date of Birth Social Security Number Address Home Phone MRN    1955  [id number]  [place] [address] [phone] [id number]    Islam Marital Status          [employer] Legally        Admission Date Admission Type Admitting Provider Attending Provider Department, Room/Bed    1/21/20 Elective Daniel Ruiz, Daniel Olvera DO [facility] 3C, 392/1    Discharge Date Discharge Disposition Discharge Destination                       Attending Provider:  Daniel Ruiz DO    Allergies:  Percocet [Oxycodone-acetaminophen], Pravachol [Pravastatin Sodium]    Isolation:  None   Infection:  None   Code Status:  CPR    Ht:  172.7 cm (68\")   Wt:  70.3 kg (155 lb)    Admission Cmt:  None   Principal Problem:  None                Active Insurance as of 1/21/2020     Primary Coverage     Payor Plan Insurance Group Employer/Plan Group    ANTHEM MEDICARE REPLACEMENT ANTHEM MEDICARE ADVANTAGE [id number]     Payor Plan Address Payor Plan Phone Number Payor Plan Fax Number Effective Dates    [phone]  1/1/2020 - None Entered    [facility] [address]       Subscriber Name Subscriber Birth Date Member ID       ROBERT YANG 1955 [id number]                 Emergency Contacts      (Rel.) Home Phone Work Phone Mobile Phone    Martin De (Son) [phone] -- --               History & Physical      Daniel Ruiz DO at 01/22/20 0822          H&P reviewed. The patient was examined and there are no changes to the H&P.          Electronically signed by Daniel Ruiz DO at 01/22/20 0822   Source Note          Robert Yang  [id number]  [id number]  392/1  Daniel Ruiz DO  1/21/2020    Chief Complaint: Abdominal aortic aneurysm    HPI: Robert Yang is a patient well-known to us.  We do follow-up for " abdominal aortic aneurysm.  She does also have a history of chronic back pain with lower extremity radiculopathy.  She has been scheduled multiple times for aneurysm repair of her 4.7 cm saccular abdominal aortic aneurysm.  She is very anxious.  Unfortunately she does continue to smoke.  She will undergo endovascular abdominal aortic aneurysm repair tomorrow.    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    • Cervical cancer (CMS/HCC)    • COPD (chronic obstructive pulmonary disease) (CMS/HCC)    • Depression    • Diabetes mellitus (CMS/HCC)    • Generalized anxiety disorder 12/15/2019   • Hx of degenerative disc disease    • Hypertension    • Panic attacks    • Personal history of nicotine dependence 2019   • PONV (postoperative nausea and vomiting)    • Preoperative respiratory examination 2019    Added automatically from request for surgery [id number]   • Shortness of breath    • Tobacco abuse        Past Surgical History:   Procedure Laterality Date   • CERVICAL CONE BIOPSY     •  SECTION     • HYSTERECTOMY      Due to cancer.  [facility]SO   • OOPHORECTOMY         Family History   Problem Relation Age of Onset   • Breast cancer Sister         Unknown   • Hypertension Mother    • Heart disease Mother    • Cancer Mother    • Heart attack Brother    • Heart attack Brother    • Heart attack Brother    • Heart attack Brother    • Colon cancer Neg Hx    • Ovarian cancer Neg Hx        Social History     Socioeconomic History   • Marital status: Legally      Spouse name: Not on file   • Number of children: Not on file   • Years of education: Not on file   • Highest education level: Not on file   Tobacco Use   • Smoking status: Current Every Day Smoker     Packs/day: 1.00     Types: Cigarettes   • Smokeless tobacco:  Never Used   • Tobacco comment: Cutting back but not ready to quit.   Substance and Sexual Activity   • Alcohol use: No   • Drug use: No   • Sexual activity: Defer     Birth control/protection: Surgical       Allergies   Allergen Reactions   • Pravachol [Pravastatin Sodium] Myalgia       Medications Prior to Admission   Medication Sig Dispense Refill Last Dose   • albuterol (PROVENTIL HFA;VENTOLIN HFA) 108 (90 Base) MCG/ACT inhaler Inhale 2 puffs Every 6 (Six) Hours As Needed for Wheezing.   Taking   • ALPRAZolam (XANAX) 1 MG tablet Take 0.5 mg by mouth Daily With Lunch.   Taking   • amLODIPine (NORVASC) 10 MG tablet Take 10 mg by mouth Daily.   Taking   • aspirin 81 MG EC tablet Take 81 mg by mouth Daily.   Taking   • carvedilol (COREG) 25 MG tablet Take 25 mg by mouth 2 (Two) Times a Day With Meals. Takes with lunch and supper   Taking   • citalopram (CeleXA) 40 MG tablet Take 20 mg by mouth Daily.   Taking   • Diphenhydramine-APAP, sleep, (TYLENOL PM EXTRA STRENGTH PO) Take 2 tablets by mouth Every Night.   Taking   • KRILL OIL PO Take 500 mg by mouth Daily.   Taking   • metFORMIN (GLUCOPHAGE) 500 MG tablet Take 500 mg by mouth 2 (Two) Times a Day With Meals.   Taking   • oxyCODONE-acetaminophen (PERCOCET) 7.5-325 MG per tablet Take 1 tablet by mouth Every 6 (Six) Hours As Needed for Severe Pain .   Taking   • simvastatin (ZOCOR) 40 MG tablet Take 40 mg by mouth Every Night.   Taking       Review of Systems   Constitutional: Negative.    HENT: Negative.    Eyes: Negative.    Respiratory: Negative.    Cardiovascular: Negative.    Gastrointestinal: Negative.    Endocrine: Negative.    Genitourinary: Negative.    Musculoskeletal: Positive for back pain.   Skin: Negative.    Allergic/Immunologic: Negative.    Neurological: Negative.    Hematological: Negative.    Psychiatric/Behavioral: The patient is nervous/anxious.    All other systems reviewed and are negative.      /72 (BP Location: Left arm, Patient  "Position: Sitting)   Pulse 66   Temp 98.4 °F (36.9 °C)   Resp 16   Ht 172.7 cm (68\")   Wt 70.3 kg (155 lb)   SpO2 95%   BMI 23.57 kg/m²    Physical Exam   Constitutional: She is oriented to person, place, and time. She appears well-developed and well-nourished.   HENT:   Head: Normocephalic and atraumatic.   Eyes: Pupils are equal, round, and reactive to light. No scleral icterus.   Neck: Neck supple. No JVD present. Carotid bruit is not present. No thyromegaly present.   Cardiovascular: Normal rate, regular rhythm and normal heart sounds.   Pulses:       Carotid pulses are 2+ on the right side, and 2+ on the left side.       Femoral pulses are 2+ on the right side, and 2+ on the left side.       Popliteal pulses are 2+ on the right side, and 2+ on the left side.        Dorsalis pedis pulses are 2+ on the right side, and 2+ on the left side.        Posterior tibial pulses are 2+ on the right side, and 2+ on the left side.   Pulmonary/Chest: Effort normal and breath sounds normal.   Abdominal: Soft. Bowel sounds are normal. She exhibits pulsatile midline mass. She exhibits no distension, no abdominal bruit and no mass. There is no hepatosplenomegaly. There is no tenderness.   Musculoskeletal: Normal range of motion. She exhibits no edema.   Lymphadenopathy:     She has no cervical adenopathy.   Neurological: She is alert and oriented to person, place, and time. She has normal strength. No cranial nerve deficit or sensory deficit.   Skin: Skin is warm, dry and intact.   Psychiatric: She has a normal mood and affect.   Nursing note and vitals reviewed.      Lab Results (last 7 days)     ** No results found for the last 168 hours. **          Imaging Results (Last 7 Days)     ** No results found for the last 168 hours. **          Impression:  1. Abdominal aortic aneurysm  2. Tobacco abuse  3. Chronic back pain  4. Anxiety    Plan:  Admission orders placed.  We will proceed with endovascular abdominal aortic " aneurysm repair tomorrow.  Risks/benefits were explained at great length to the patient which include but are not limited to bleeding, infection, vessel rupture, bowel damage, renal failure, MI, stroke, and death.  I will order nicotine patch if she would use while hospitalized.  I will continue home medications, which include pain medication and anxiety medication.  NPO after midnight and labs to be drawn.        Daniel Ruiz DO  1/21/2020  12:43 PM        Electronically signed by Daniel Ruiz DO at 01/21/20 1251             Daniel Ruiz DO at 01/21/20 1243          Robert Daniels  [id number]  [id number]  392/1  Daniel Ruiz DO  1/21/2020    Chief Complaint: Abdominal aortic aneurysm    HPI: Robert Daniels is a patient well-known to us.  We do follow-up for abdominal aortic aneurysm.  She does also have a history of chronic back pain with lower extremity radiculopathy.  She has been scheduled multiple times for aneurysm repair of her 4.7 cm saccular abdominal aortic aneurysm.  She is very anxious.  Unfortunately she does continue to smoke.  She will undergo endovascular abdominal aortic aneurysm repair tomorrow.    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    • Cervical cancer (CMS/HCC)    • COPD (chronic obstructive pulmonary disease) (CMS/HCC)    • Depression    • Diabetes mellitus (CMS/HCC)    • Generalized anxiety disorder 12/15/2019   • Hx of degenerative disc disease    • Hypertension    • Panic attacks    • Personal history of nicotine dependence 11/4/2019   • PONV (postoperative nausea and vomiting)    • Preoperative respiratory examination 9/27/2019    Added automatically from request for surgery [id number]   • Shortness of breath    • Tobacco abuse        Past Surgical History:   Procedure Laterality  Date   • CERVICAL CONE BIOPSY     •  SECTION     • HYSTERECTOMY      Due to cancer.  [facility]   • OOPHORECTOMY         Family History   Problem Relation Age of Onset   • Breast cancer Sister         Unknown   • Hypertension Mother    • Heart disease Mother    • Cancer Mother    • Heart attack Brother    • Heart attack Brother    • Heart attack Brother    • Heart attack Brother    • Colon cancer Neg Hx    • Ovarian cancer Neg Hx        Social History     Socioeconomic History   • Marital status: Legally      Spouse name: Not on file   • Number of children: Not on file   • Years of education: Not on file   • Highest education level: Not on file   Tobacco Use   • Smoking status: Current Every Day Smoker     Packs/day: 1.00     Types: Cigarettes   • Smokeless tobacco: Never Used   • Tobacco comment: Cutting back but not ready to quit.   Substance and Sexual Activity   • Alcohol use: No   • Drug use: No   • Sexual activity: Defer     Birth control/protection: Surgical       Allergies   Allergen Reactions   • Pravachol [Pravastatin Sodium] Myalgia       Medications Prior to Admission   Medication Sig Dispense Refill Last Dose   • albuterol (PROVENTIL HFA;VENTOLIN HFA) 108 (90 Base) MCG/ACT inhaler Inhale 2 puffs Every 6 (Six) Hours As Needed for Wheezing.   Taking   • ALPRAZolam (XANAX) 1 MG tablet Take 0.5 mg by mouth Daily With Lunch.   Taking   • amLODIPine (NORVASC) 10 MG tablet Take 10 mg by mouth Daily.   Taking   • aspirin 81 MG EC tablet Take 81 mg by mouth Daily.   Taking   • carvedilol (COREG) 25 MG tablet Take 25 mg by mouth 2 (Two) Times a Day With Meals. Takes with lunch and supper   Taking   • citalopram (CeleXA) 40 MG tablet Take 20 mg by mouth Daily.   Taking   • Diphenhydramine-APAP, sleep, (TYLENOL PM EXTRA STRENGTH PO) Take 2 tablets by mouth Every Night.   Taking   • KRILL OIL PO Take 500 mg by mouth Daily.   Taking   • metFORMIN (GLUCOPHAGE) 500 MG tablet Take 500 mg by mouth 2  "(Two) Times a Day With Meals.   Taking   • oxyCODONE-acetaminophen (PERCOCET) 7.5-325 MG per tablet Take 1 tablet by mouth Every 6 (Six) Hours As Needed for Severe Pain .   Taking   • simvastatin (ZOCOR) 40 MG tablet Take 40 mg by mouth Every Night.   Taking       Review of Systems   Constitutional: Negative.    HENT: Negative.    Eyes: Negative.    Respiratory: Negative.    Cardiovascular: Negative.    Gastrointestinal: Negative.    Endocrine: Negative.    Genitourinary: Negative.    Musculoskeletal: Positive for back pain.   Skin: Negative.    Allergic/Immunologic: Negative.    Neurological: Negative.    Hematological: Negative.    Psychiatric/Behavioral: The patient is nervous/anxious.    All other systems reviewed and are negative.      /72 (BP Location: Left arm, Patient Position: Sitting)   Pulse 66   Temp 98.4 °F (36.9 °C)   Resp 16   Ht 172.7 cm (68\")   Wt 70.3 kg (155 lb)   SpO2 95%   BMI 23.57 kg/m²    Physical Exam   Constitutional: She is oriented to person, place, and time. She appears well-developed and well-nourished.   HENT:   Head: Normocephalic and atraumatic.   Eyes: Pupils are equal, round, and reactive to light. No scleral icterus.   Neck: Neck supple. No JVD present. Carotid bruit is not present. No thyromegaly present.   Cardiovascular: Normal rate, regular rhythm and normal heart sounds.   Pulses:       Carotid pulses are 2+ on the right side, and 2+ on the left side.       Femoral pulses are 2+ on the right side, and 2+ on the left side.       Popliteal pulses are 2+ on the right side, and 2+ on the left side.        Dorsalis pedis pulses are 2+ on the right side, and 2+ on the left side.        Posterior tibial pulses are 2+ on the right side, and 2+ on the left side.   Pulmonary/Chest: Effort normal and breath sounds normal.   Abdominal: Soft. Bowel sounds are normal. She exhibits pulsatile midline mass. She exhibits no distension, no abdominal bruit and no mass. There is no " hepatosplenomegaly. There is no tenderness.   Musculoskeletal: Normal range of motion. She exhibits no edema.   Lymphadenopathy:     She has no cervical adenopathy.   Neurological: She is alert and oriented to person, place, and time. She has normal strength. No cranial nerve deficit or sensory deficit.   Skin: Skin is warm, dry and intact.   Psychiatric: She has a normal mood and affect.   Nursing note and vitals reviewed.      Lab Results (last 7 days)     ** No results found for the last 168 hours. **          Imaging Results (Last 7 Days)     ** No results found for the last 168 hours. **          Impression:  1. Abdominal aortic aneurysm  2. Tobacco abuse  3. Chronic back pain  4. Anxiety    Plan:  Admission orders placed.  We will proceed with endovascular abdominal aortic aneurysm repair tomorrow.  Risks/benefits were explained at great length to the patient which include but are not limited to bleeding, infection, vessel rupture, bowel damage, renal failure, MI, stroke, and death.  I will order nicotine patch if she would use while hospitalized.  I will continue home medications, which include pain medication and anxiety medication.  NPO after midnight and labs to be drawn.        Daniel Ruiz DO  1/21/2020  12:43 PM        Electronically signed by Daniel Ruiz DO at 01/21/20 1251         Current Facility-Administered Medications   Medication Dose Route Frequency Provider Last Rate Last Dose   • diphenhydrAMINE (BENADRYL) capsule 25 mg  25 mg Oral Nightly PRN Stacia Bower APRN   25 mg at 01/21/20 2005    And   • acetaminophen (TYLENOL) tablet 650 mg  650 mg Oral Nightly PRN Stacia Bower APRN   650 mg at 01/21/20 2005   • ALPRAZolam (XANAX) tablet 0.5 mg  0.5 mg Oral Daily With Lunch Stacia Bower APRN       • amLODIPine (NORVASC) tablet 10 mg  10 mg Oral Q24H Stacia Bower APRN       • aspirin chewable tablet 81 mg  81 mg Oral Daily Stacia Bower APRN       •  atorvastatin (LIPITOR) tablet 20 mg  20 mg Oral Nightly Stacia Bower, APRN       • carvedilol (COREG) tablet 25 mg  25 mg Oral BID With Meals Stacia Bower, APRN   25 mg at 01/21/20 1817   • citalopram (CeleXA) tablet 20 mg  20 mg Oral Daily Stacia Bower, APRN       • LORazepam (ATIVAN) injection 1 mg  1 mg Intravenous Q4H PRN Stacia Bower, APRN   1 mg at 01/22/20 0956   • metFORMIN (GLUCOPHAGE) tablet 500 mg  500 mg Oral BID With Meals Stacia Bower, APRN   500 mg at 01/21/20 1817   • nicotine (NICODERM CQ) 21 MG/24HR patch 1 patch  1 patch Transdermal Q24H Daniel Ruiz DO       • ondansetron (ZOFRAN) tablet 4 mg  4 mg Oral Q6H PRN BickingDaniel, DO        Or   • ondansetron (ZOFRAN) injection 4 mg  4 mg Intravenous Q6H PRN BickingDaniel DO       • sodium chloride 0.9 % flush 10 mL  10 mL Intravenous Q12H BickingDaniel DO   10 mL at 01/21/20 1347   • sodium chloride 0.9 % flush 10 mL  10 mL Intravenous PRN BickingDaniel DO       • sodium chloride 0.9 % infusion  100 mL/hr Intravenous Continuous BickingDaniel  mL/hr at 01/21/20 2342 100 mL/hr at 01/21/20 2342       Lab Results (last 24 hours)     Procedure Component Value Units Date/Time    Basic Metabolic Panel [[id number]]  (Abnormal) Collected:  01/21/20 1259    Specimen:  Blood Updated:  01/21/20 1326     Glucose 262 mg/dL      BUN 9 mg/dL      Creatinine 0.77 mg/dL      Sodium 139 mmol/L      Potassium 4.9 mmol/L      Chloride 98 mmol/L      CO2 29.0 mmol/L      Calcium 9.3 mg/dL      eGFR Non African Amer 75 mL/min/1.73      BUN/Creatinine Ratio 11.7     Anion Gap 12.0 mmol/L     Narrative:       GFR Normal >60  Chronic Kidney Disease <60  Kidney Failure <15      aPTT [[id number]]  (Normal) Collected:  01/21/20 1259    Specimen:  Blood Updated:  01/21/20 1318     PTT 33.7 seconds     Protime-INR [[id number]]  (Normal) Collected:  01/21/20 1259    Specimen:  Blood Updated:  01/21/20 1319      Protime 13.5 Seconds      INR 1.00    CBC Auto Differential [[id number]]  (Abnormal) Collected:  01/21/20 1259    Specimen:  Blood Updated:  01/21/20 1308     WBC 7.14 10*3/mm3      RBC 5.15 10*6/mm3      Hemoglobin 16.8 g/dL      Hematocrit 48.8 %      MCV 94.8 fL      MCH 32.6 pg      MCHC 34.4 g/dL      RDW 11.6 %      RDW-SD 40.0 fl      MPV 9.1 fL      Platelets 234 10*3/mm3      Neutrophil % 56.8 %      Lymphocyte % 30.3 %      Monocyte % 7.7 %      Eosinophil % 4.1 %      Basophil % 0.8 %      Immature Grans % 0.3 %      Neutrophils, Absolute 4.06 10*3/mm3      Lymphocytes, Absolute 2.16 10*3/mm3      Monocytes, Absolute 0.55 10*3/mm3      Eosinophils, Absolute 0.29 10*3/mm3      Basophils, Absolute 0.06 10*3/mm3      Immature Grans, Absolute 0.02 10*3/mm3      nRBC 0.0 /100 WBC         pt to have AAA repair later today. IVF infusing

## 2020-01-22 NOTE — ANESTHESIA PREPROCEDURE EVALUATION
Anesthesia Evaluation     Patient summary reviewed   history of anesthetic complications (after a ): PONV  NPO Solid Status: > 8 hours  NPO Liquid Status: > 4 hours           Airway   Mallampati: I  TM distance: >3 FB  Neck ROM: full  Dental    (+) edentulous    Pulmonary    (+) a smoker (1 ppd) Current Smoked day of surgery, COPD, wheezes,   (-) asthma, recent URI, sleep apnea, no home oxygen  Cardiovascular   Exercise tolerance: good (4-7 METS)    ECG reviewed  Patient on routine beta blocker and Beta blocker given within 24 hours of surgery  Rhythm: regular  Rate: normal    (+) hypertension,   (-) pacemaker, past MI, angina, cardiac stents, CABG      Neuro/Psych  (+) psychiatric history Anxiety and Depression,     (-) seizures, TIA, CVA  GI/Hepatic/Renal/Endo    (+)   renal disease (Right atrophic kidney), diabetes mellitus,   (-) GERD, liver disease, no thyroid disorder    Musculoskeletal     Abdominal    Substance History      OB/GYN          Other                      Anesthesia Plan    ASA 3     general     intravenous induction     Anesthetic plan, all risks, benefits, and alternatives have been provided, discussed and informed consent has been obtained with: patient.

## 2020-01-22 NOTE — ANESTHESIA PROCEDURE NOTES
Arterial Line    Pre-sedation assessment completed: 1/22/2020 1:00 PM    Patient reassessed immediately prior to procedure    Patient location during procedure: holding area  Start time: 1/22/2020 1:12 PM  Stop Time:1/22/2020 1:14 PM       Line placed for hemodynamic monitoring and ABGs/Labs/ISTAT.  Performed By   Anesthesiologist: Rashaun Winslow MD  Preanesthetic Checklist  Completed: patient identified, site marked, surgical consent, pre-op evaluation, timeout performed, IV checked, risks and benefits discussed and monitors and equipment checked  Arterial Line Prep   Sterile Tech: gloves  Prep: ChloraPrep  Patient monitoring: continuous pulse oximetry  Arterial Line Procedure   Laterality:left  Location:  radial artery  Catheter size: 20 G   Guidance: ultrasound guided  PROCEDURE NOTE/ULTRASOUND INTERPRETATION.  Using ultrasound guidance the potential vascular sites for insertion of the catheter were visualized to determine the patency of the vessel to be used for vascular access.  After selecting the appropriate site for insertion, the needle was visualized under ultrasound being inserted into the radial artery, followed by ultrasound confirmation of wire and catheter placement. There were no abnormalities seen on ultrasound; an image was taken; and the patient tolerated the procedure with no complications.   Number of attempts: 2 (Initial attempt by palpation, unable to get catheter to thread)  Successful placement: yes  Post Assessment   Dressing Type: occlusive dressing applied, secured with tape and wrist guard applied.   Complications no  Circ/Move/Sens Assessment: normal and unchanged.   Patient Tolerance: patient tolerated the procedure well with no apparent complications

## 2020-01-22 NOTE — PROGRESS NOTES
Discharge Planning Assessment  [facility]     Patient Name: Robert Daniels  MRN: [id number]  Today's Date: 1/22/2020    Admit Date: 1/21/2020    Discharge Needs Assessment     Row Name 01/22/20 1319       Living Environment    Lives With  alone    Current Living Arrangements  home/apartment/condo    Primary Care Provided by  self    Provides Primary Care For  no one    Family Caregiver if Needed  child(benjamin), adult    Family Caregiver Names  Son    Quality of Family Relationships  involved;helpful;supportive    Able to Return to Prior Arrangements  yes       Resource/Environmental Concerns    Resource/Environmental Concerns  none       Transition Planning    Patient/Family Anticipates Transition to  home    Patient/Family Anticipated Services at Transition  none    Transportation Anticipated  family or friend will provide       Discharge Needs Assessment    Readmission Within the Last 30 Days  no previous admission in last 30 days    Concerns to be Addressed  denies needs/concerns at this time    Equipment Currently Used at Home  none    Anticipated Changes Related to Illness  none    Equipment Needed After Discharge  none    Discharge Coordination/Progress  Pt lives at home alone and plans to return home at d/c. She has a son and other family that lives near her and they will be able to help her if needed. She does not want home health and denies needs. Pt has rx coverage and is able to get her medication. Pt is going to surgery today so will continue to follow.        Discharge Plan    No documentation.       Destination      Coordination has not been started for this encounter.      Durable Medical Equipment      Coordination has not been started for this encounter.      Dialysis/Infusion      Coordination has not been started for this encounter.      Home Medical Care      Coordination has not been started for this encounter.      Therapy      Coordination has not been started for this encounter.      [facility]  Resources      Coordination has not been started for this encounter.          Demographic Summary    No documentation.       Functional Status    No documentation.       Psychosocial    No documentation.       Abuse/Neglect    No documentation.       Legal    No documentation.       Substance Abuse    No documentation.       Patient Forms    No documentation.           LUCAS Cedeno

## 2020-01-22 NOTE — ANESTHESIA PROCEDURE NOTES
Airway  Urgency: elective    Date/Time: 1/22/2020 2:19 PM  Airway not difficult    General Information and Staff    Patient location during procedure: OR  CRNA: Katie Harris CRNA    Indications and Patient Condition  Indications for airway management: airway protection    Preoxygenated: yes  Mask difficulty assessment: 1 - vent by mask    Final Airway Details  Final airway type: endotracheal airway      Successful airway: ETT  Cuffed: yes   Successful intubation technique: direct laryngoscopy  Facilitating devices/methods: intubating stylet  Endotracheal tube insertion site: oral  Blade: Good  Blade size: 3 (3.5)  ETT size (mm): 7.5  Cormack-Lehane Classification: grade I - full view of glottis  Placement verified by: chest auscultation and capnometry   Cuff volume (mL): 8  Measured from: lips  ETT/EBT  to lips (cm): 21  Number of attempts at approach: 1  Assessment: lips, teeth, and gum same as pre-op and atraumatic intubation    Additional Comments  By  Lise SRNA

## 2020-01-22 NOTE — ANESTHESIA POSTPROCEDURE EVALUATION
"Patient: Robert Daniels    Procedure Summary     Date:  01/22/20 Room / Location:   [facility] OR  /  [facility] HYBRID OR 12    Anesthesia Start:  1410 Anesthesia Stop:  1551    Procedure:  ABDOMINAL AORTIC ANEURYSM REPAIR WITH ENDOGRAFT (Bilateral Thigh) Diagnosis:       Abdominal aortic aneurysm (AAA) without rupture (CMS/HCC)      Preop testing      (Abdominal aortic aneurysm (AAA) without rupture (CMS/HCC) [I71.4])      (Preop testing [Z01.818])    Surgeon:  Daniel Ruiz DO Provider:  Katie Harris CRNA    Anesthesia Type:  general ASA Status:  3          Anesthesia Type: general    Vitals  Vitals Value Taken Time   /68 1/22/2020  4:45 PM   Temp 97.4 °F (36.3 °C) 1/22/2020  3:48 PM   Pulse 60 1/22/2020  4:50 PM   Resp 16 1/22/2020  4:45 PM   SpO2 95 % 1/22/2020  4:50 PM   Vitals shown include unvalidated device data.        Post Anesthesia Care and Evaluation    Patient location during evaluation: PACU  Patient participation: complete - patient participated  Level of consciousness: awake and alert  Pain management: satisfactory to patient  Airway patency: patent  Anesthetic complications: No anesthetic complications    Cardiovascular status: acceptable  Respiratory status: acceptable  Hydration status: acceptable    Comments: Blood pressure 152/68, pulse 61, temperature 97.4 °F (36.3 °C), temperature source Temporal, resp. rate 16, height 172.7 cm (68\"), weight 70.3 kg (155 lb), SpO2 95 %, not currently breastfeeding.    Pt discharged from PACU based on lisha score >8  No anesthesia care post op    "

## 2020-01-23 VITALS
RESPIRATION RATE: 18 BRPM | HEIGHT: 68 IN | HEART RATE: 58 BPM | WEIGHT: 155 LBS | SYSTOLIC BLOOD PRESSURE: 130 MMHG | OXYGEN SATURATION: 96 % | DIASTOLIC BLOOD PRESSURE: 57 MMHG | TEMPERATURE: 98.7 F | BODY MASS INDEX: 23.49 KG/M2

## 2020-01-23 LAB
ANION GAP SERPL CALCULATED.3IONS-SCNC: 9 MMOL/L (ref 5–15)
BUN BLD-MCNC: 9 MG/DL (ref 8–23)
BUN/CREAT SERPL: 13.6 (ref 7–25)
CALCIUM SPEC-SCNC: 8.5 MG/DL (ref 8.6–10.5)
CHLORIDE SERPL-SCNC: 100 MMOL/L (ref 98–107)
CO2 SERPL-SCNC: 28 MMOL/L (ref 22–29)
CREAT BLD-MCNC: 0.66 MG/DL (ref 0.57–1)
GFR SERPL CREATININE-BSD FRML MDRD: 90 ML/MIN/1.73
GLUCOSE BLD-MCNC: 216 MG/DL (ref 65–99)
POTASSIUM BLD-SCNC: 4.5 MMOL/L (ref 3.5–5.2)
SODIUM BLD-SCNC: 137 MMOL/L (ref 136–145)

## 2020-01-23 PROCEDURE — 25010000002 CEFAZOLIN PER 500 MG: Performed by: SURGERY

## 2020-01-23 PROCEDURE — 99024 POSTOP FOLLOW-UP VISIT: CPT | Performed by: NURSE PRACTITIONER

## 2020-01-23 PROCEDURE — 80048 BASIC METABOLIC PNL TOTAL CA: CPT | Performed by: SURGERY

## 2020-01-23 RX ADMIN — HYDROCODONE BITARTRATE AND ACETAMINOPHEN 1 TABLET: 5; 325 TABLET ORAL at 05:52

## 2020-01-23 RX ADMIN — AMLODIPINE BESYLATE 10 MG: 10 TABLET ORAL at 08:34

## 2020-01-23 RX ADMIN — ASPIRIN 81 MG: 81 TABLET ORAL at 08:35

## 2020-01-23 RX ADMIN — CITALOPRAM 20 MG: 20 TABLET, FILM COATED ORAL at 08:36

## 2020-01-23 RX ADMIN — Medication 2 G: at 05:36

## 2020-01-23 RX ADMIN — ALPRAZOLAM 0.5 MG: 0.5 TABLET ORAL at 02:24

## 2020-01-23 NOTE — PAYOR COMM NOTE
"Robert Yang (64 y.o. Female) [id number]    Additional clinical including op note  From 1/22.   [facility] phone    Fax         Date of Birth Social Security Number Address Home Phone MRN    1955  [id number] [place] [place] [address] [phone] [id number]    Quaker Marital Status          Worship Legally        Admission Date Admission Type Admitting Provider Attending Provider Department, Room/Bed    1/21/20 Elective Daniel Ruiz DO Bicking, Griffin K, DO [facility] 3C, 392/1    Discharge Date Discharge Disposition Discharge Destination         Home or Self Care              Attending Provider:  Daniel Ruiz DO    Allergies:  Percocet [Oxycodone-acetaminophen], Pravachol [Pravastatin Sodium]    Isolation:  None   Infection:  None   Code Status:  CPR    Ht:  172.7 cm (68\")   Wt:  70.3 kg (155 lb)    Admission Cmt:  None   Principal Problem:  Abdominal aortic aneurysm (AAA) without rupture (CMS/HCC) [I71.4] More...                 Active Insurance as of 1/21/2020     Primary Coverage     Payor Plan Insurance Group Employer/Plan Group    ANTH MEDICARE REPLACEMENT ANTHEM MEDICARE ADVANTAGE [id number]     Payor Plan Address Payor Plan Phone Number Payor Plan Fax Number Effective Dates    [address] [phone]  1/1/2020 - None Entered    [facility] [id number]       Subscriber Name Subscriber Birth Date Member ID       ROBERT YANG 1955 [id number]                 Emergency Contacts      (Rel.) Home Phone Work Phone Mobile Phone    Martin De (Son) [phone] -- --               Operative/Procedure Notes (last 24 hours) (Notes from 01/22/20 0958 through 01/23/20 0958)      Daniel Ruiz DO at 01/22/20 1330        Robert Yang  1/22/2020     PREOPERATIVE DIAGNOSIS: Abdominal aortic aneurysm (AAA) without rupture (CMS/HCC) [I71.4]  Preop testing [Z01.818]     POSTOPERATIVE " DIAGNOSIS: Post-Op Diagnosis Codes:     * Abdominal aortic aneurysm (AAA) without rupture (CMS/[facility]) [I71.4]     * Preop testing [Z01.818]     PROCEDURE PERFORMED:   1.  Ultrasound-guided cannulation of bilateral common femoral arteries  2.  Introduction of catheter/sheath into the aorta  3.  Aortoiliac angiogram with radiographic supervision and interpretation  4.  Placement of a Medtronic endurant modular bifurcated device with 2 docking limbs measuring 32 x 14 x 103  5.  Placement of a contralateral extension limb in the left common iliac artery measuring 16 x 13 x 93  6.  Placement of an ipsilateral extension limb in the right common iliac artery measuring 16 x 13 x 93  7.  Transcatheter delivery of enhanced fixation device using the APTUS SA-85 anchors x 4 through a 22 mm guide sheath  8.  Balloon angioplasty of the graft with a Reliant balloon  9.  Balloon angioplasty of the distal right common iliac artery with a 9 x 40 mm Graford balloon  10.  Perclose closure of bilateral common femoral arteries     SURGEON: Daniel Ruiz DO      ANESTHESIA: General.    PREPARATION: Routine.    STAFF: Circulator: Maria Elena Saunders RN  Scrub Person: Edilia Ward Melissa K  Vascular Radiology Technician: Taylor Landis    Estimated Blood Loss: 50 mL    SPECIMENS: None    COMPLICATIONS: None    INDICATIONS: Robert Daniels is a 64 y.o. female who we recently saw for an abdominal aortic aneurysm.  She has a history of back problems and was having pain down both legs.  She had an incidental finding of a saccular 1.7 cm aneurysm found on a noncontrast CT.  She does smoke about 3/4 pack of cigarettes per day.  She denies any family history of aneurysms.  She is here today for endovascular abdominal aortic aneurysm repair.  The indications, risks, and possible complications of the procedure were explained to the patient, who voiced understanding and wished to proceed with surgery.     PROCEDURE IN DETAIL:   The  patient was taken to the operating room and placed on the operating table in a supine position. After general anesthesia was obtained, the abdomen and bilateral groins was prepped and draped in a sterile manner.  Under ultrasound guidance and using a micropuncture technique the right common femoral artery was cannulated and a micro-sheath was placed.  A small stab incision was made with 11 blade.  The advantage Glidewire was advanced up into the aorta under fluoroscopic guidance.  The 7 Ivorian sheath was placed for predilatation.  The first Perclose device was placed and deployed at the 2 o'clock position.  The second one was placed and deployed at the 10 o'clock position.  The 11 Ivorian sheath was placed.  Patient was given 1000 units of intravenous heparin.  The same procedure was performed in the left groin.  Under ultrasound guidance and using a micropuncture technique the left common femoral artery was cannulated and a micro-sheath was placed.  A small stab incision was made with 11 blade.  The advantage Glidewire was advanced into the aorta under fluoroscopic guidance.  The 7 Ivorian sheath was placed for predilatation.  The first Perclose device was placed and deployed at the 2 o'clock position.  The second was placed and deployed at the 10 o'clock position.  The 11 Ivorian sheath was placed.  The patient was given 6000 units of intravenous heparin.  Both flush catheters were advanced into the aorta and an aortoiliac angiogram was performed.  The appropriate measurements were taken.  The Medtronic endurant modular bifurcated device with 2 docking limbs measuring 32 x 14 x 103 was placed from the right side up to the level of the renal arteries.  A magged up view the renal arteries was performed.  The graft was then successfully deployed.  The suprarenal stent was successfully deployed.  Next, the flush catheter was captured from the left side and the gate was successful cannulated.  The catheter was spun to  ensure that we were in true lumen.  With the C-arm at 20°  MERCADO a retrograde angiogram was performed from the left side.  The hypogastric was marked on the screen.  The contralateral limb measuring 16 x 13 x 93 was successfully placed and deployed in the left common iliac artery.  A n 11 Puerto Rican sheath was then placed.  The rest of the main body device was successfully deployed.  The delivery device was captured and a 16 Puerto Rican sheath was placed.  With the C-arm at 20°  RANDY a retrograde angiogram was performed from the right side.  Hypogastric was marked on the screen.  The ipsilateral extension limb measuring 16 x 13 x 93 was placed successfully in the right common iliac artery.  Lastly, the Reliant balloon was used to balloon angioplasty the entire graft.  Also, a 9 x 40 mm Towns balloon was used to balloon angioplasty the distal right common iliac artery. The decision was made to perform transcatheter delivery of enhanced fixation device using the APTUS SA-85 anchors x 4 through a 22 mm guide sheath in the proximal graft. Completion angiogram was performed which showed rapid flow down through the graft and out through the iliac arteries without any evidence of stenosis or occlusion.   There was no evidence of type I, type II, type III, or type IV endoleak's.  At this point I felt no further intervention was warranted.  The sheaths were removed.  The Perclose devices were used to seal down the artery.  Direct pressure was held for an additional 10 minutes of ensure hemostasis.  5 mL of 0.5% Marcaine plain was used to infiltrate each groin for local anesthesia.  The skin was then reapproximated using a 4-0 Monocryl in a subcuticular fashion. Sterile dressings were applied. The patient tolerated the procedure well. Sponge and needle counts were correct. The patient was then awakened and extubated in the operating room and taken to the recovery room in good condition.  Pulse exam the left leg was consistent with  preoperative examination.  Daniel Ruiz DO  Date: 1/22/2020 Time: 3:38 PM    CC:Titi Bullock MD    Electronically signed by Daniel Ruiz DO at 01/22/20 5638         Nurse  Note :   Pt has moderate to severe c/o pain, some r/t chronic lower back issues. PO and IV pain relief as needed w/ effective results. Pt PRN xanax after issues sleeping. Ramirez to d/c this AM. IVF, IV abx as ordered. DP and PT 2+ palpable at this time. Tele, sinus wilian. Cont to monitor.    1/22 sat drop  89 %   o2 placed  3 lit n/c  sats 90's    1/23  o2 2lit  Sat 90 %

## 2020-01-23 NOTE — DISCHARGE SUMMARY
Date of Discharge:  1/23/2020    Discharge Diagnosis: AAA (abdominal aortic aneurysm) without rupture (CMS/[facility]) [I71.4]    Presenting Problem/History of Present Illness  AAA (abdominal aortic aneurysm) without rupture (CMS/[facility]) [I71.4]  AAA (abdominal aortic aneurysm) (CMS/[facility]) [I71.4]       Hospital Course  Patient is a 64 y.o. female who is following for an abdominal aortic aneurysm.  She has a history of back problems with radiculopathy down both legs.  She did have a finding of a saccular 4.7 cm aneurysm and came in for elective repair.  She did undergo endovascular abdominal aortic aneurysm repair without incident.    She was transferred to PACU for continued monitoring.    Her groins remained soft, vitals signs stable, and pulses intact.    She was transferred to 3C for continued monitoring.    She has done well through the night.  Vitals are stable.    Her groins are soft without hematoma and pulses intact.    She is stable and ready for discharge.  We will see her back in 2 weeks for post operative follow up.  Written and verbal instructions were given.  This was all discussed in full with complete understanding.                Procedures Performed  Procedure(s):  ABDOMINAL AORTIC ANEURYSM REPAIR WITH ENDOGRAFT       Consults:   Consults     No orders found from 12/23/2019 to 1/22/2020.            Condition on Discharge: Stable    Discharge Medications     Discharge Medications      Continue These Medications      Instructions Start Date   albuterol sulfate  (90 Base) MCG/ACT inhaler  Commonly known as:  PROVENTIL HFA;VENTOLIN HFA;PROAIR HFA   2 puffs, Inhalation, Every 6 Hours PRN      ALPRAZolam 1 MG tablet  Commonly known as:  XANAX   0.5 mg, Oral, 3 Times Daily PRN      amLODIPine 10 MG tablet  Commonly known as:  NORVASC   10 mg, Oral, Daily      aspirin 81 MG EC tablet   81 mg, Oral, Daily      carvedilol 25 MG tablet  Commonly known as:  COREG   25 mg, Oral, 2 Times Daily With Meals, Takes  with lunch and supper      citalopram 40 MG tablet  Commonly known as:  CeleXA   20 mg, Oral, Daily      KRILL OIL PO   500 mg, Oral, Daily      metFORMIN 500 MG tablet  Commonly known as:  GLUCOPHAGE   500 mg, Oral, 2 Times Daily With Meals      oxyCODONE-acetaminophen 7.5-325 MG per tablet  Commonly known as:  PERCOCET   1 tablet, Oral, Every 8 Hours PRN      TYLENOL PM EXTRA STRENGTH PO   2 tablets, Oral, Nightly      ZOCOR 40 MG tablet  Generic drug:  simvastatin   40 mg, Oral, Nightly             Discharge Diet:   Diet Instructions     Diet: Consistent Carbohydrate; Thin      Discharge Diet:  Consistent Carbohydrate    Fluid Consistency:  Thin          Activity at Discharge:   Activity Instructions     Bathing Restrictions      Type of Restriction:  Bathing    Bathing Restrictions:  Other    Explain Bathing Restrictions:  may shower tomorrow    Driving Restrictions      Type of Restriction:  Driving    Driving Restrictions:  No Driving (Time Limited)    Length:  1 Week    Lifting Restrictions      Type of Restriction:  Lifting    Lifting Restrictions:  Lifting Restriction (Indicate Limit)    Weight Limit (Pounds):  10    Length of Lifting Restriction:  1 week    Other Activity Restrictions      Type of Restriction:  Other    Explain Other Restrictions:  no bending, squatting, or straining    Work Restrictions      Type of Restriction:  Work    May Return to Work:  After Next Appointment    With / Without Restrictions:  Without Restrictions          Follow-up Appointments  Future Appointments   Date Time Provider Department Center   9/8/2020 11:00 AM [facility] None   9/14/2020  2:10 PM Leroy Holloway MD [facility] None     Additional Instructions for the Follow-ups that You Need to Schedule     Discharge Follow-up with Specialty: Dr. Ruiz; 2 Weeks   As directed      Specialty:  Dr. Ruiz    Follow Up:  2 Weeks    Follow Up Details:  post op AAA             I did spend greater  than 30 minutes reviewing the patient's chart, face-to-face encounter, and arranging discharge.    Stacia Bower, JASON  01/23/20  8:00 AM

## 2020-01-23 NOTE — PLAN OF CARE
Problem: Patient Care Overview  Goal: Plan of Care Review  Outcome: Ongoing (interventions implemented as appropriate)  Flowsheets (Taken 1/23/2020 2920)  Progress: improving  Plan of Care Reviewed With: patient  Outcome Summary: Pt has moderate to severe c/o pain, some r/t chronic lower back issues. PO and IV pain relief as needed w/ effective results. Pt PRN xanax after issues sleeping. Ramirez to d/c this AM. IVF, IV abx as ordered. DP and PT 2+ palpable at this time. Tele, sinus wilian. Cont to monitor.

## 2020-01-23 NOTE — NURSING NOTE
Pt left the floor x2 through shift. After activity restriction 2030 1/22 to leave floor to smoke. Also left the floor this morning before shift exchange at 0645 to smoke. Pt took IV pole and was escorted by her son in a wheelchair. Safety maintained.

## 2020-01-23 NOTE — PAYOR COMM NOTE
"Robert Yang (64 y.o. Female) [id number]    D/C notice  Faxing summary .    [facility] phone    Fax        Date of Birth Social Security Number Address Home Phone MRN    1955  [address] [facility] [place] [place] [address] [phone] [id number]    Mandaen Marital Status          Yazidism Legally        Admission Date Admission Type Admitting Provider Attending Provider Department, Room/Bed    1/21/20 Elective Bicking, Daniel GERMAIN, [facility] 3C, 392/1    Discharge Date Discharge Disposition Discharge Destination        1/23/2020 Home or Self Care              Attending Provider:  (none)   Allergies:  Percocet [Oxycodone-acetaminophen], Pravachol [Pravastatin Sodium]    Isolation:  None   Infection:  None   Code Status:  CPR    Ht:  172.7 cm (68\")   Wt:  70.3 kg (155 lb)    Admission Cmt:  None   Principal Problem:  Abdominal aortic aneurysm (AAA) without rupture (CMS/HCC) [I71.4] More...                 Active Insurance as of 1/21/2020     Primary Coverage     Payor Plan Insurance Group Employer/Plan Group    ANTHEM MEDICARE REPLACEMENT ANTHEM MEDICARE ADVANTAGE [id number]     Payor Plan Address Payor Plan Phone Number Payor Plan Fax Number Effective Dates    [address] [phone]  1/1/2020 - None Entered    [facility] [address]       Subscriber Name Subscriber Birth Date Member ID       ROBERT YANG 1955 [id number]                 Emergency Contacts      (Rel.) Home Phone Work Phone Mobile Phone    Martin De (Son) [phone] -- --               Discharge Summary      Stacia Bower APRN at 01/23/20 0800            Date of Discharge:  1/23/2020    Discharge Diagnosis: AAA (abdominal aortic aneurysm) without rupture (CMS/HCC) [I71.4]    Presenting Problem/History of Present Illness  AAA (abdominal aortic aneurysm) without rupture (CMS/HCC) [I71.4]  AAA (abdominal aortic aneurysm) (CMS/HCC) " [I71.4]       Hospital Course  Patient is a 64 y.o. female who is following for an abdominal aortic aneurysm.  She has a history of back problems with radiculopathy down both legs.  She did have a finding of a saccular 4.7 cm aneurysm and came in for elective repair.  She did undergo endovascular abdominal aortic aneurysm repair without incident.    She was transferred to PACU for continued monitoring.     Her groins remained soft, vitals signs stable, and pulses intact.     She was transferred to 3C for continued monitoring.     She has done well through the night.  Vitals are stable.     Her groins are soft without hematoma and pulses intact.     She is stable and ready for discharge.  We will see her back in 2 weeks for post operative follow up.  Written and verbal instructions were given.  This was all discussed in full with complete understanding.                Procedures Performed  Procedure(s):  ABDOMINAL AORTIC ANEURYSM REPAIR WITH ENDOGRAFT       Consults:   Consults     No orders found from 12/23/2019 to 1/22/2020.            Condition on Discharge: Stable    Discharge Medications     Discharge Medications      Continue These Medications      Instructions Start Date   albuterol sulfate  (90 Base) MCG/ACT inhaler  Commonly known as:  PROVENTIL HFA;VENTOLIN HFA;PROAIR HFA   2 puffs, Inhalation, Every 6 Hours PRN      ALPRAZolam 1 MG tablet  Commonly known as:  XANAX   0.5 mg, Oral, 3 Times Daily PRN      amLODIPine 10 MG tablet  Commonly known as:  NORVASC   10 mg, Oral, Daily      aspirin 81 MG EC tablet   81 mg, Oral, Daily      carvedilol 25 MG tablet  Commonly known as:  COREG   25 mg, Oral, 2 Times Daily With Meals, Takes with lunch and supper      citalopram 40 MG tablet  Commonly known as:  CeleXA   20 mg, Oral, Daily      KRILL OIL PO   500 mg, Oral, Daily      metFORMIN 500 MG tablet  Commonly known as:  GLUCOPHAGE   500 mg, Oral, 2 Times Daily With Meals      oxyCODONE-acetaminophen 7.5-325  MG per tablet  Commonly known as:  PERCOCET   1 tablet, Oral, Every 8 Hours PRN      TYLENOL PM EXTRA STRENGTH PO   2 tablets, Oral, Nightly      ZOCOR 40 MG tablet  Generic drug:  simvastatin   40 mg, Oral, Nightly             Discharge Diet:   Diet Instructions     Diet: Consistent Carbohydrate; Thin      Discharge Diet:  Consistent Carbohydrate    Fluid Consistency:  Thin          Activity at Discharge:   Activity Instructions     Bathing Restrictions      Type of Restriction:  Bathing    Bathing Restrictions:  Other    Explain Bathing Restrictions:  may shower tomorrow    Driving Restrictions      Type of Restriction:  Driving    Driving Restrictions:  No Driving (Time Limited)    Length:  1 Week    Lifting Restrictions      Type of Restriction:  Lifting    Lifting Restrictions:  Lifting Restriction (Indicate Limit)    Weight Limit (Pounds):  10    Length of Lifting Restriction:  1 week    Other Activity Restrictions      Type of Restriction:  Other    Explain Other Restrictions:  no bending, squatting, or straining    Work Restrictions      Type of Restriction:  Work    May Return to Work:  After Next Appointment    With / Without Restrictions:  Without Restrictions          Follow-up Appointments  Future Appointments   Date Time Provider Department Center   9/8/2020 11:00 AM [facility] None   9/14/2020  2:10 PM Leroy Holloway MD [facility] None     Additional Instructions for the Follow-ups that You Need to Schedule     Discharge Follow-up with Specialty: Dr. Ruiz; 2 Weeks   As directed      Specialty:  Dr. uRiz    Follow Up:  2 Weeks    Follow Up Details:  post op AAA             I did spend greater than 30 minutes reviewing the patient's chart, face-to-face encounter, and arranging discharge.    JASON Palumbo  01/23/20  8:00 AM        Electronically signed by Stacia Bower APRN at 01/23/20 0803       Discharge Order (From admission, onward)     Start      Ordered    01/23/20 0747  Discharge patient  Once     Expected Discharge Date:  01/23/20    Expected Discharge Time:  Morning    Discharge Disposition:  Home or Self Care    Physician of Record for Attribution - Please select from Treatment Team:  DEREK YUEN [[id number]]    Review needed by CMO to determine Physician of Record:  No       Question Answer Comment   Physician of Record for Attribution - Please select from Treatment Team DEREK YUEN    Review needed by CMO to determine Physician of Record No        01/23/20 0800

## 2020-01-24 ENCOUNTER — READMISSION MANAGEMENT (OUTPATIENT)
Dept: CALL CENTER | Facility: HOSPITAL | Age: 65
End: 2020-01-24

## 2020-01-24 NOTE — OUTREACH NOTE
Prep Survey      Responses   Facility patient discharged from?  [facility]   Is patient eligible?  Yes   Discharge diagnosis  ABDOMINAL AORTIC ANEURYSM REPAIR WITH ENDOGRAFT   Does the patient have one of the following disease processes/diagnoses(primary or secondary)?  General Surgery   Does the patient have Home health ordered?  No   Is there a DME ordered?  No   Comments regarding appointments  SEE AVS   Medication alerts for this patient  NO CHANGE   Prep survey completed?  Yes          Bonny Bustamante RN

## 2020-01-26 ENCOUNTER — NURSE TRIAGE (OUTPATIENT)
Dept: CALL CENTER | Facility: HOSPITAL | Age: 65
End: 2020-01-26

## 2020-01-26 NOTE — TELEPHONE ENCOUNTER
Asking about dressing removal and showering after her surgery. Discussed discharge paperwork, dressing to be removed in 48 hours and okay to shower. Discussed okay to wash above and let soapy water run down over incisions. Rinse and pat dry. States steri strips are in place. Discussed leaving them in place and will come off on their own. Discussed s/s that require immediate attention. States incisions are clean, dry, and not swollen. States bruising to both sites. No further questions.     Reason for Disposition  • Getting the incision wet, questions about    Additional Information  • Negative: Sounds like a life-threatening emergency to the triager  • Negative: Chest pain  • Negative: Difficulty breathing  • Negative: Surgical incision symptoms and questions  • Negative: [1] Discomfort (pain, burning or stinging) when passing urine AND [2] male  • Negative: [1] Discomfort (pain, burning or stinging) when passing urine AND [2] female  • Negative: Constipation  • Negative: New or worsening leg (calf, thigh) pain  • Negative: New or worsening leg swelling  • Negative: Dizziness is severe, or persists > 24 hours after surgery  • Negative: Pain, redness, swelling, or pus at IV Site  • Negative: Symptoms arising from use of a urinary catheter (Ramirez or Coude)  • Negative: Cast problems or questions  • Negative: Medication question  • Negative: [1] Widespread rash AND [2] bright red, sunburn-like  • Negative: [1] SEVERE headache AND [2] after spinal (epidural) anesthesia  • Negative: [1] Vomiting AND [2] persists > 4 hours  • Negative: [1] Vomiting AND [2] abdomen looks much more swollen than usual  • Negative: [1] Drinking very little AND [2] dehydration suspected (e.g., no urine > 12 hours, very dry mouth, very lightheaded)  • Negative: Patient sounds very sick or weak to the triager  • Negative: Sounds like a serious complication to the triager  • Negative: Fever > 100.4 F (38.0 C)  • Negative: [1] SEVERE post-op pain  "(e.g., excruciating, pain scale 8-10) AND [2] not controlled with pain medications  • Negative: [1] Caller has URGENT question AND [2] triager unable to answer question  • Negative: [1] Headache AND [2] after spinal (epidural) anesthesia AND [3] not severe  • Negative: Fever present > 3 days (72 hours)  • Negative: [1] MILD-MODERATE post-op pain (e.g., pain scale 1-7) AND [2] not controlled with pain medications  • Negative: [1] Caller has NON-URGENT question AND [2] triager unable to answer question  • Negative: General activity, questions about  • Negative: Resuming driving, questions about  • Negative: Resuming sexual relations, questions about  • Negative: Throat pain after surgery, questions about  • Negative: [1] Vomiting AND [2] present < 4 hours  • Negative: Other post-op symptom or question    Answer Assessment - Initial Assessment Questions  1. SYMPTOM: \"What's the main symptom you're concerned about?\" (e.g., pain, fever, vomiting)      See note  2. ONSET: \"When did n/a  start?\"      n/a  3. SURGERY: \"What surgery was performed?\"      n/a  4. DATE of SURGERY: \"When was surgery performed?\"       n/a  5. ANESTHESIA: \" What type of anesthesia did you have?\" (e.g., general, spinal, epidural, local)      n/a  6. PAIN: \"Is there any pain?\" If so, ask: \"How bad is it?\"  (Scale 1-10; or mild, moderate, severe)      n/a  7. FEVER: \"Do you have a fever?\" If so, ask: \"What is your temperature, how was it measured, and when did it start?\"      n/a  8. VOMITING: \"Is there any vomiting?\" If yes, ask: \"How many times?\"      n/a  9. BLEEDING: \"Is there any bleeding?\" If so, ask: \"How much?\" and \"Where?\"      n/a  10. OTHER SYMPTOMS: \"Do you have any other symptoms?\" (e.g., drainage from wound, painful urination, constipation)        n/a    Protocols used: POST-OP SYMPTOMS AND QUESTIONS-[facility]      "

## 2020-01-28 ENCOUNTER — READMISSION MANAGEMENT (OUTPATIENT)
Dept: CALL CENTER | Facility: HOSPITAL | Age: 65
End: 2020-01-28

## 2020-01-28 NOTE — OUTREACH NOTE
General Surgery Week 1 Survey      Responses   Facility patient discharged from?  [facility]   Does the patient have one of the following disease processes/diagnoses(primary or secondary)?  General Surgery   Is there a successful TCM telephone encounter documented?  No   Week 1 attempt successful?  No   Call start time  0815   Unsuccessful attempts  Attempt 1          Astrid Chi RN

## 2020-01-29 ENCOUNTER — READMISSION MANAGEMENT (OUTPATIENT)
Dept: CALL CENTER | Facility: HOSPITAL | Age: 65
End: 2020-01-29

## 2020-01-29 NOTE — OUTREACH NOTE
General Surgery Week 1 Survey      Responses   Facility patient discharged from?  [facility]   Does the patient have one of the following disease processes/diagnoses(primary or secondary)?  General Surgery   Is there a successful TCM telephone encounter documented?  No   Week 1 attempt successful?  Yes   Call start time  1340   Rescheduled  Rescheduled-pt requested   Call end time  1340   Discharge diagnosis  ABDOMINAL AORTIC ANEURYSM REPAIR WITH ENDOGRAFT          Roxanne Benitez RN

## 2020-01-31 ENCOUNTER — READMISSION MANAGEMENT (OUTPATIENT)
Dept: CALL CENTER | Facility: HOSPITAL | Age: 65
End: 2020-01-31

## 2020-01-31 NOTE — OUTREACH NOTE
General Surgery Week 1 Survey      Responses   Facility patient discharged from?  [facility]   Does the patient have one of the following disease processes/diagnoses(primary or secondary)?  General Surgery   Is there a successful TCM telephone encounter documented?  No   Week 1 attempt successful?  Yes   Call start time  1132   Call end time  1138   Discharge diagnosis  ABDOMINAL AORTIC ANEURYSM REPAIR WITH ENDOGRAFT   Meds reviewed with patient/caregiver?  Yes   Is the patient having any side effects they believe may be caused by any medication additions or changes?  No   Does the patient have all medications related to this admission filled (includes all antibiotics, pain medications, etc.)  N/A   Is the patient taking all medications as directed (includes completed medication regime)?  Yes   Does the patient have a follow up appointment scheduled with their surgeon?  Yes [2/7/20]   Has the patient kept scheduled appointments due by today?  N/A   Psychosocial issues?  No   Did the patient receive a copy of their discharge instructions?  Yes   Nursing interventions  Reviewed instructions with patient   What is the patient's perception of their health status since discharge?  Improving [Pt reports she's having some pain in her lower back. She believes it may be from laying. ]   Nursing interventions  Nurse provided patient education   Is the patient /caregiver able to teach back basic post-op care?  No tub bath, swimming, or hot tub until instructed by MD, Take showers only when approved by MD-sponge bathe until then, Lifting as instructed by MD in discharge instructions, Keep incision areas clean,dry and protected, Drive as instructed by MD in discharge instructions   Is the patient/caregiver able to teach back signs and symptoms of incisional infection?  Fever, Increased redness, swelling or pain at the incisonal site, Increased drainage or bleeding   Is the patient/caregiver able to teach back steps to recovery at  home?  Eat a well-balance diet, Rest and rebuild strength, gradually increase activity, Set small, achievable goals for return to baseline health   If the patient is a current smoker, are they able to teach back resources for cessation?  Smoking cessation medications, [id number] [Pt is trying to quit smoking. ]   Is the patient/caregiver able to teach back the hierarchy of who to call/visit for symptoms/problems? PCP, Specialist, Home health nurse, Urgent Care, ED, 911  Yes   Week 1 call completed?  Yes          Vero Kiran RN

## 2020-02-06 ENCOUNTER — TELEPHONE (OUTPATIENT)
Dept: VASCULAR SURGERY | Facility: CLINIC | Age: 65
End: 2020-02-06

## 2020-02-06 NOTE — TELEPHONE ENCOUNTER
Left message reminding Ms Daniels of her appointment for Friday, February 7th, 2020 at 1015 am with Stacia GOMEZ. Also advised Ms Daniels if she had any questions or needed to reschedule to please call the office at [phone].

## 2020-02-08 ENCOUNTER — READMISSION MANAGEMENT (OUTPATIENT)
Dept: CALL CENTER | Facility: HOSPITAL | Age: 65
End: 2020-02-08

## 2020-02-08 NOTE — OUTREACH NOTE
General Surgery Week 2 Survey      Responses   Facility patient discharged from?  [facility]   Does the patient have one of the following disease processes/diagnoses(primary or secondary)?  General Surgery   Week 2 attempt successful?  Yes   Call start time  1155   Call end time  1202   Discharge diagnosis  ABDOMINAL AORTIC ANEURYSM REPAIR WITH ENDOGRAFT   Meds reviewed with patient/caregiver?  Yes   Is the patient taking all medications as directed (includes completed medication regime)?  Yes   Has the patient kept scheduled appointments due by today?  No   What is preventing the patient from keeping their appointments?  -- [weather related]   Nursing Interventions  -- [Appt rescheduled with Dr. Ruiz 2/10/20]   Psychosocial issues?  No   What is the patient's perception of their health status since discharge?  Improving [Pt reports she's still having back pain. She is taking Mortin for pain. It helps a little. ]   Nursing interventions  Nurse provided patient education   Is the patient /caregiver able to teach back basic post-op care?  Lifting as instructed by MD in discharge instructions, Keep incision areas clean,dry and protected, Drive as instructed by MD in discharge instructions   Is the patient/caregiver able to teach back signs and symptoms of incisional infection?  Fever   Is the patient/caregiver able to teach back steps to recovery at home?  Eat a well-balance diet   If the patient is a current smoker, are they able to teach back resources for cessation?  [id number] [Pt smokes, but she's trying to quit. ]   Week 2 call completed?  Yes          Vero Kiran RN

## 2020-02-17 ENCOUNTER — READMISSION MANAGEMENT (OUTPATIENT)
Dept: CALL CENTER | Facility: HOSPITAL | Age: 65
End: 2020-02-17

## 2020-02-17 ENCOUNTER — OFFICE VISIT (OUTPATIENT)
Dept: VASCULAR SURGERY | Facility: CLINIC | Age: 65
End: 2020-02-17

## 2020-02-17 VITALS
BODY MASS INDEX: 23.49 KG/M2 | HEIGHT: 68 IN | WEIGHT: 155 LBS | HEART RATE: 78 BPM | SYSTOLIC BLOOD PRESSURE: 112 MMHG | OXYGEN SATURATION: 92 % | DIASTOLIC BLOOD PRESSURE: 66 MMHG

## 2020-02-17 DIAGNOSIS — I71.40 ABDOMINAL AORTIC ANEURYSM (AAA) WITHOUT RUPTURE (HCC): Primary | ICD-10-CM

## 2020-02-17 PROCEDURE — 99024 POSTOP FOLLOW-UP VISIT: CPT | Performed by: NURSE PRACTITIONER

## 2020-02-17 NOTE — OUTREACH NOTE
General Surgery Week 3 Survey      Responses   Facility patient discharged from?  [facility]   Does the patient have one of the following disease processes/diagnoses(primary or secondary)?  General Surgery   Week 3 attempt successful?  Yes   Call start time  1640   Call end time  1645   Discharge diagnosis  ABDOMINAL AORTIC ANEURYSM REPAIR WITH ENDOGRAFT   Meds reviewed with patient/caregiver?  Yes   Is the patient having any side effects they believe may be caused by any medication additions or changes?  No   Does the patient have all medications related to this admission filled (includes all antibiotics, pain medications, etc.)  N/A   Is the patient taking all medications as directed (includes completed medication regime)?  N/A   Does the patient have a follow up appointment scheduled with their surgeon?  Yes   Has the patient kept scheduled appointments due by today?  Yes   Has home health visited the patient within 72 hours of discharge?  N/A   Psychosocial issues?  No   Comments  pt states incisions healed, having abd discomfort, saw APR, constipation discussed   Did the patient receive a copy of their discharge instructions?  Yes   Nursing interventions  Reviewed instructions with patient   What is the patient's perception of their health status since discharge?  Improving   Nursing interventions  Nurse provided patient education   Is the patient /caregiver able to teach back basic post-op care?  Take showers only when approved by MD-sponge bathe until then, No tub bath, swimming, or hot tub until instructed by MD, Keep incision areas clean,dry and protected, Lifting as instructed by MD in discharge instructions   Is the patient/caregiver able to teach back signs and symptoms of incisional infection?  Increased redness, swelling or pain at the incisonal site, Increased drainage or bleeding, Incisional warmth, Pus or odor from incision, Fever   Is the patient/caregiver able to teach back steps to recovery at home?   Rest and rebuild strength, gradually increase activity   Is the patient/caregiver able to teach back the hierarchy of who to call/visit for symptoms/problems? PCP, Specialist, Home health nurse, Urgent Care, ED, 911  Yes   Additional teach back comments  pt states has low appetite, not active, not drinking water, encourage pt to increase fluid intake, and exercise, pt states is aware of ways to improve   Week 3 call completed?  Yes          Martha Flanagan RN

## 2020-02-24 ENCOUNTER — READMISSION MANAGEMENT (OUTPATIENT)
Dept: CALL CENTER | Facility: HOSPITAL | Age: 65
End: 2020-02-24

## 2020-02-24 NOTE — OUTREACH NOTE
"General Surgery Week 4 Survey      Responses   Facility patient discharged from?  [facility]   Does the patient have one of the following disease processes/diagnoses(primary or secondary)?  General Surgery   Week 4 attempt successful?  Yes   Call start time  1017   Call end time  1025   Discharge diagnosis  ABDOMINAL AORTIC ANEURYSM REPAIR WITH ENDOGRAFT   Person spoke with today (if not patient) and relationship  Martin-son   Is the patient taking all medications as directed (includes completed medication regime)?  Yes   Has the patient kept scheduled appointments due by today?  Yes   Is the patient still receiving Home Health Services?  N/A   What is the patient's perception of their health status since discharge?  New symptoms unrelated to diagnosis [Martin reports, \"I don't really know what's going on, but she eats Tylenol because her back kills her.\" He also reports pt has \"cried\" from back pain. Advised him to have pt f/u with PCP. He reported pt has CT scan scheduled r/t back pain. MD's are aware.]   Nursing interventions  Nurse provided patient education   Is the patient/caregiver able to teach back steps to recovery at home?  Set small, achievable goals for return to baseline health, Rest and rebuild strength, gradually increase activity   Week 4 call completed?  Yes   Would the patient like one additional call?  No   Graduated  Yes   Did the patient feel the follow up calls were helpful during their recovery period?  Yes   Was the number of calls appropriate?  Yes          Vero Kiran RN  "

## 2020-03-04 ENCOUNTER — TELEPHONE (OUTPATIENT)
Dept: VASCULAR SURGERY | Facility: CLINIC | Age: 65
End: 2020-03-04

## 2020-03-05 ENCOUNTER — OFFICE VISIT (OUTPATIENT)
Dept: VASCULAR SURGERY | Facility: CLINIC | Age: 65
End: 2020-03-05

## 2020-03-05 ENCOUNTER — HOSPITAL ENCOUNTER (OUTPATIENT)
Dept: CT IMAGING | Facility: HOSPITAL | Age: 65
Discharge: HOME | End: 2020-03-05
Admitting: NURSE PRACTITIONER

## 2020-03-05 VITALS
SYSTOLIC BLOOD PRESSURE: 112 MMHG | HEIGHT: 68 IN | HEART RATE: 84 BPM | WEIGHT: 156 LBS | BODY MASS INDEX: 23.64 KG/M2 | OXYGEN SATURATION: 95 % | DIASTOLIC BLOOD PRESSURE: 60 MMHG

## 2020-03-05 DIAGNOSIS — I10 ESSENTIAL HYPERTENSION: ICD-10-CM

## 2020-03-05 DIAGNOSIS — I71.43 ANEURYSM OF INFRARENAL ABDOMINAL AORTA (HCC): Primary | ICD-10-CM

## 2020-03-05 DIAGNOSIS — Z72.0 TOBACCO ABUSE: ICD-10-CM

## 2020-03-05 DIAGNOSIS — I71.40 ABDOMINAL AORTIC ANEURYSM (AAA) WITHOUT RUPTURE (HCC): ICD-10-CM

## 2020-03-05 LAB — CREAT BLDA-MCNC: 0.7 MG/DL (ref 0.6–1.3)

## 2020-03-05 PROCEDURE — 74174 CTA ABD&PLVS W/CONTRAST: CPT

## 2020-03-05 PROCEDURE — 82565 ASSAY OF CREATININE: CPT

## 2020-03-05 PROCEDURE — 99024 POSTOP FOLLOW-UP VISIT: CPT | Performed by: SURGERY

## 2020-03-05 PROCEDURE — 0 IOPAMIDOL PER 1 ML: Performed by: NURSE PRACTITIONER

## 2020-03-05 RX ORDER — IBUPROFEN 200 MG
200 TABLET ORAL EVERY 6 HOURS PRN
COMMUNITY
End: 2020-09-15

## 2020-03-05 RX ORDER — CLOPIDOGREL BISULFATE 75 MG/1
75 TABLET ORAL DAILY
Qty: 30 TABLET | Refills: 5 | Status: SHIPPED | OUTPATIENT
Start: 2020-03-05

## 2020-03-05 RX ADMIN — IOPAMIDOL 100 ML: 755 INJECTION, SOLUTION INTRAVENOUS at 09:07

## 2020-03-05 NOTE — PROGRESS NOTES
"3/5/2020       Titi Bullock MD  [address]  [place] [address]        Robert Daniels  1955    Chief Complaint   Patient presents with   • Follow-up     2 wk f/u with CTA of the abdomen/pelvis for graft surveillence.  Pt states that she is still hurting in her back and LLQ.         Dear Titi Bullock MD:    HPI     I had the pleasure of seeing you patient in the office today for follow up.  As you recall, the patient is a 65 y.o. female who we recently saw for an abdominal aortic aneurysm.  She has a history of back problems and was having pain down both legs.  She had an incidental finding of a saccular 1.7 cm aneurysm found on a noncontrast CT.  She does smoke about 3/4 pack of cigarettes per day.  She denies any family history of aneurysms.    She underwent endovascular abdominal aortic aneurysm repair 1/22/2020.  Her groins have healed.  She has been complaining of lumbar back discomfort with radiation to both groins.  She did have noninvasive testing performed in which I personally reviewed.    Review of Systems   Constitutional: Negative.    HENT: Negative.    Eyes: Negative.    Respiratory: Negative.    Cardiovascular: Negative.    Gastrointestinal: Negative.    Endocrine: Negative.    Genitourinary: Negative.    Musculoskeletal: Negative.    Skin: Negative.    Allergic/Immunologic: Negative.    Neurological: Negative.  Negative for dizziness.   Hematological: Negative.    Psychiatric/Behavioral: Negative.        /60   Pulse 84   Ht 172.7 cm (68\")   Wt 70.8 kg (156 lb)   SpO2 95%   BMI 23.72 kg/m²   Physical Exam   Constitutional: She is oriented to person, place, and time. She appears well-developed and well-nourished. No distress.   HENT:   Head: Normocephalic and atraumatic.   Mouth/Throat: Oropharynx is clear and moist.   Eyes: Pupils are equal, round, and reactive to light. No scleral icterus.   Neck: Normal range of motion. Neck supple. No JVD present. Carotid bruit is not " present. No thyromegaly present.   Cardiovascular: Normal rate, regular rhythm, S2 normal, normal heart sounds, intact distal pulses and normal pulses. Exam reveals no gallop and no friction rub.   No murmur heard.  Bilateral groins healed   Pulmonary/Chest: Effort normal and breath sounds normal.   Abdominal: Soft. Normal appearance and bowel sounds are normal. She exhibits distension (Slightly distended.). She exhibits no pulsatile midline mass. There is no hepatosplenomegaly. There is no tenderness.   Musculoskeletal: Normal range of motion.   Neurological: She is alert and oriented to person, place, and time. No cranial nerve deficit.   Skin: Skin is warm and dry. She is not diaphoretic.   Psychiatric: She has a normal mood and affect. Her behavior is normal. Judgment and thought content normal.   Nursing note and vitals reviewed.    DIAGNOSTIC DATA:    Ct Angiogram Abdomen Pelvis    Result Date: 3/5/2020  Narrative: EXAMINATION:   CT ANGIOGRAM ABDOMEN PELVIS-  3/5/2020 9:14 AM CST  HISTORY: CT ANGIOGRAM ABDOMEN PELVIS- 3/5/2020 8:43 AM CST  HISTORY: Abdominal aortic aneurysm (AAA), known, follow up; I71.4-Abdominal aortic aneurysm, without rupture  COMPARISON: September 10, 2019  DOSE LENGTH PRODUCT: 505 mGy cm. Automated exposure control was also utilized to decrease patient radiation dose.  TECHNIQUE: Helical tomographic images of the abdomen and pelvis utilizing angiographic protocol were obtained following the intravenous infusion of contrast. Multiplanar and 3 D reformatted images were provided for review.  FINDINGS:  Angiogram: The abdominal aorta is visualized. Calcified and soft plaque is present. An endovascular aortic stent graft is present. The stent graft is intact. There is no evidence of a leak..  The right limb of the  stent graft extends into the right iliac artery the internal and external iliac arteries are visualized.. The left limb of the stent graft extends to the left common iliac artery.  The left internal and external iliac arteries are visualized. Scattered plaque is noted in the right and the left iliac arteries..  The celiac artery and its major branches are normal in appearance. The superior mesenteric artery and its proximal branches are normal in appearance. The inferior mesenteric artery is not visualized..  A single left renal artery is visualized. The right renal artery is not identified collaterals demonstrate flow to the upper half of the right kidney. 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  Other findings: Decreased CT attenuation of the liver is noted. Gallbladder is visualized. Pancreas is normal.  Spleen is normal.  Adrenal glands are visualized. The left renal contour is normal. Only the upper pole of the left kidney enhances. Calcifications in the right kidney are noted. No acute osseous abnormalities identified. The bladder is unremarkable.      Impression: 1. Endovascular stent graft is present. 2. 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 Collaterals demonstrate flow to the upper pole of the right kidney.  This report was finalized on 03/05/2020 09:25 by Dr. Abraham Mukherjee MD.      Patient Active Problem List   Diagnosis   • H/O vulvar dysplasia   • History of cervical cancer   • Tobacco abuse   • Hypertension   • Diabetes mellitus (CMS/HCC)   • Aneurysm of infrarenal abdominal aorta (CMS/HCC)   • COPD (chronic obstructive pulmonary disease) (CMS/HCC)   • Abdominal aortic aneurysm (AAA) without rupture (CMS/HCC)   • Preoperative respiratory examination   • Personal history of nicotine dependence   • Preop testing   • Generalized anxiety disorder   • AAA (abdominal aortic aneurysm) without rupture (CMS/HCC)   • AAA (abdominal aortic aneurysm) (CMS/HCC)         ICD-10-CM ICD-9-CM   1. Aneurysm of infrarenal abdominal aorta (CMS/HCC) I71.4 441.4   2. Essential hypertension I10 401.9   3. Tobacco abuse Z72.0  305.1       PLAN: After thoroughly evaluating Robert Daniels, I am pleased to report she is doing quite well status post endovascular abdominal aortic aneurysm repair.  She would like to have off 2 more weeks due to the back pain which is fine by me.  I will see her back in 6 months time with a repeat CTA for continued for surveillance.  I would also like for her to stop her baby aspirin and begin Plavix daily.  Prescription was sent in.  I also recommended that she take something for her bowels to help with constipation.  We also discussed vascular risk factors as they pertain to progression of vascular disease including controlling her hypertension, diabetes mellitus, and smoking cessation.  I did  extensively on smoking cessation, and the patient was advised of the continued risks of smoking.  I provided over 10 minutes counseling on this matter. Body mass index is 23.72 kg/m². The patient is to continue taking their medications as previously discussed.   This was all discussed in full with complete understanding.  Thank you for allowing me to participate in the care of your patient.  Please do not hesitate to call with any questions or concerns.  We will keep you aware of any further encounters with Robert Daniels.      Sincerely Yours,      Daniel Ruiz, DO

## 2020-03-07 ENCOUNTER — NURSE TRIAGE (OUTPATIENT)
Dept: CALL CENTER | Facility: HOSPITAL | Age: 65
End: 2020-03-07

## 2020-03-07 NOTE — TELEPHONE ENCOUNTER
"Caller wanting to know if can take Ibuprofen with Plavix. Suggesed. Voiced understanding    Reason for Disposition  • Caller has medication question only, adult not sick, and triager answers question    Additional Information  • Negative: Drug overdose and nurse unable to answer question  • Negative: Caller requesting information not related to medicine  • Negative: Caller requesting a prescription for Strep throat and has a positive culture result  • Negative: Rash while taking a medication or within 3 days of stopping it  • Negative: Immunization reaction suspected  • Negative: [1] Asthma and [2] having symptoms of asthma (cough, wheezing, etc)  • Negative: MORE THAN A DOUBLE DOSE of a prescription or over-the-counter (OTC) drug  • Negative: [1] DOUBLE DOSE (an extra dose or lesser amount) of over-the-counter (OTC) drug AND [2] any symptoms (e.g., dizziness, nausea, pain, sleepiness)  • Negative: [1] DOUBLE DOSE (an extra dose or lesser amount) of prescription drug AND [2] any symptoms (e.g., dizziness, nausea, pain, sleepiness)  • Negative: Took another person's prescription drug  • Negative: [1] DOUBLE DOSE (an extra dose or lesser amount) of prescription drug AND [2] NO symptoms (Exception: a double dose of antibiotics)  • Negative: Diabetes drug error or overdose (e.g., insulin or extra dose)  • Negative: [1] Request for URGENT new prescription or refill of \"essential\" medication (i.e., likelihood of harm to patient if not taken) AND [2] triager unable to fill per unit policy  • Negative: [1] Prescription not at pharmacy AND [2] was prescribed today by PCP  • Negative: Pharmacy calling with prescription questions and triager unable to answer question  • Negative: Caller has URGENT medication question about med that PCP prescribed and triager unable to answer question  • Negative: Caller has NON-URGENT medication question about med that PCP prescribed and triager unable to answer question  • Negative: Caller " "requesting a NON-URGENT new prescription or refill and triager unable to refill per unit policy  • Negative: Caller has medication question about med not prescribed by PCP and triager unable to answer question (e.g., compatibility with other med, storage)  • Negative: [1] DOUBLE DOSE (an extra dose or lesser amount) of over-the-counter (OTC) drug AND [2] NO symptoms  • Negative: [1] DOUBLE DOSE (an extra dose or lesser amount) of antibiotic drug AND [2] NO symptoms    Answer Assessment - Initial Assessment Questions  1. SYMPTOMS: \"Do you have any symptoms?\"      na  2. SEVERITY: If symptoms are present, ask \"Are they mild, moderate or severe?\"      moderate    Protocols used: MEDICATION QUESTION CALL-ADULT-      "

## 2020-03-08 ENCOUNTER — NURSE TRIAGE (OUTPATIENT)
Dept: CALL CENTER | Facility: HOSPITAL | Age: 65
End: 2020-03-08

## 2020-03-08 NOTE — TELEPHONE ENCOUNTER
"Asking why you're not supposed to take motrin with plavix? Explained due to motrin being an NSAID combined with plavix can cause increased risk of bleeding. Asking about tylenol, explained okay to take tylenol she states it doesn't work well for her. Asking if she can take 2? Explained yes, okay to take two tylenol as long as she doesn't go over the recommended dosage/daily.     Reason for Disposition  • Caller has medication question only, adult not sick, and triager answers question    Additional Information  • Negative: Drug overdose and nurse unable to answer question  • Negative: Caller requesting information not related to medicine  • Negative: Caller requesting a prescription for Strep throat and has a positive culture result  • Negative: Rash while taking a medication or within 3 days of stopping it  • Negative: Immunization reaction suspected  • Negative: [1] Asthma and [2] having symptoms of asthma (cough, wheezing, etc)  • Negative: MORE THAN A DOUBLE DOSE of a prescription or over-the-counter (OTC) drug  • Negative: [1] DOUBLE DOSE (an extra dose or lesser amount) of over-the-counter (OTC) drug AND [2] any symptoms (e.g., dizziness, nausea, pain, sleepiness)  • Negative: [1] DOUBLE DOSE (an extra dose or lesser amount) of prescription drug AND [2] any symptoms (e.g., dizziness, nausea, pain, sleepiness)  • Negative: Took another person's prescription drug  • Negative: [1] DOUBLE DOSE (an extra dose or lesser amount) of prescription drug AND [2] NO symptoms (Exception: a double dose of antibiotics)  • Negative: Diabetes drug error or overdose (e.g., insulin or extra dose)  • Negative: [1] Request for URGENT new prescription or refill of \"essential\" medication (i.e., likelihood of harm to patient if not taken) AND [2] triager unable to fill per unit policy  • Negative: [1] Prescription not at pharmacy AND [2] was prescribed today by PCP  • Negative: Pharmacy calling with prescription questions and triager " "unable to answer question  • Negative: Caller has URGENT medication question about med that PCP prescribed and triager unable to answer question  • Negative: Caller has NON-URGENT medication question about med that PCP prescribed and triager unable to answer question  • Negative: Caller requesting a NON-URGENT new prescription or refill and triager unable to refill per unit policy  • Negative: Caller has medication question about med not prescribed by PCP and triager unable to answer question (e.g., compatibility with other med, storage)  • Negative: [1] DOUBLE DOSE (an extra dose or lesser amount) of over-the-counter (OTC) drug AND [2] NO symptoms  • Negative: [1] DOUBLE DOSE (an extra dose or lesser amount) of antibiotic drug AND [2] NO symptoms    Answer Assessment - Initial Assessment Questions  1. SYMPTOMS: \"Do you have any symptoms?\"      See note  2. SEVERITY: If symptoms are present, ask \"Are they mild, moderate or severe?\"      n/a    Protocols used: MEDICATION QUESTION CALL-ADULT-      "

## 2020-03-09 NOTE — TELEPHONE ENCOUNTER
"Spoke with patient who advised that she had attempted to go the [facility] but that they were \"booked up\" and that she was now going to [facility].  She stated that she was now throwing up.  Advised Dr. Ruiz of the conversation.   " What Type Of Note Output Would You Prefer (Optional)?: Standard Output Hpi Title: Evaluation of a Skin Lesion How Severe Are Your Spot(S)?: mild Have Your Spot(S) Been Treated In The Past?: has not been treated

## 2020-03-10 ENCOUNTER — APPOINTMENT (OUTPATIENT)
Dept: CT IMAGING | Facility: HOSPITAL | Age: 65
End: 2020-03-10

## 2020-03-18 ENCOUNTER — TELEPHONE (OUTPATIENT)
Dept: VASCULAR SURGERY | Facility: CLINIC | Age: 65
End: 2020-03-18

## 2020-03-18 NOTE — TELEPHONE ENCOUNTER
Ms Daniels called in stating she need a work excuse to be off through April 16th, 2020 as her back is still hurting and she is having to use a heating pad. Ms Daniels just wanted to see if she could get an excuse to cover her until April 16th, 2020 as she has to work this weekend. I advised Ms Daniels I would talk with Stacia GOMEZ and Dr Ruiz and give her a call back.       Spoke with Dr Ruiz and Stacia GOMEZ they advised they could not write her a medical excuse to be off work through April 16th, 2020 as she was medically cleared from [facility] to return after her follow up on March 5th, 2020.       Spoke with Mrs Daniels letting her know that I had spoken with Dr Ruiz and Stacia GOMEZ and they advised that they could give her an excuse through April 16th, 2020 as she had been medically released to go back to work after her follow up on March 5th, 2020. Mrs Daniels stated her PCP wouldn't put her off because he wasn't the one who originally put her off work. I advised Ms Daniels that from our [facility] she was medically able to return to work. If she was having some problems with her back her PCP would have to follow her and put her off work for that. Mrs Daniels verbalized understanding.

## 2020-03-27 ENCOUNTER — NURSE TRIAGE (OUTPATIENT)
Dept: CALL CENTER | Facility: HOSPITAL | Age: 65
End: 2020-03-27

## 2020-03-27 NOTE — TELEPHONE ENCOUNTER
"States she had surgery 1/22/2020    States she has it done she has had severe back pain. States Dr. Ruiz has done a CT and said everything looks good. States tylenol doesn't help. Asking if she can use a [employer] heat patch? Explained should be fine as that is very superficial, it shouldn't cause any issues. She verbalized understanding. Asking if she can take ibuprofen since she is on plavix? Explained cannot okay that as it can increase risk of bleeding, will need to check with her MD. She verbalized understanding.    Reason for Disposition  • Caller has medication question only, adult not sick, and triager answers question    Additional Information  • Negative: Drug overdose and nurse unable to answer question  • Negative: Caller requesting information not related to medicine  • Negative: Caller requesting a prescription for Strep throat and has a positive culture result  • Negative: Rash while taking a medication or within 3 days of stopping it  • Negative: Immunization reaction suspected  • Negative: [1] Asthma and [2] having symptoms of asthma (cough, wheezing, etc)  • Negative: MORE THAN A DOUBLE DOSE of a prescription or over-the-counter (OTC) drug  • Negative: [1] DOUBLE DOSE (an extra dose or lesser amount) of over-the-counter (OTC) drug AND [2] any symptoms (e.g., dizziness, nausea, pain, sleepiness)  • Negative: [1] DOUBLE DOSE (an extra dose or lesser amount) of prescription drug AND [2] any symptoms (e.g., dizziness, nausea, pain, sleepiness)  • Negative: Took another person's prescription drug  • Negative: [1] DOUBLE DOSE (an extra dose or lesser amount) of prescription drug AND [2] NO symptoms (Exception: a double dose of antibiotics)  • Negative: Diabetes drug error or overdose (e.g., insulin or extra dose)  • Negative: [1] Request for URGENT new prescription or refill of \"essential\" medication (i.e., likelihood of harm to patient if not taken) AND [2] triager unable to fill per unit policy  • " "Negative: [1] Prescription not at pharmacy AND [2] was prescribed today by PCP  • Negative: Pharmacy calling with prescription questions and triager unable to answer question  • Negative: Caller has URGENT medication question about med that PCP prescribed and triager unable to answer question  • Negative: Caller has NON-URGENT medication question about med that PCP prescribed and triager unable to answer question  • Negative: Caller requesting a NON-URGENT new prescription or refill and triager unable to refill per unit policy  • Negative: Caller has medication question about med not prescribed by PCP and triager unable to answer question (e.g., compatibility with other med, storage)  • Negative: [1] DOUBLE DOSE (an extra dose or lesser amount) of over-the-counter (OTC) drug AND [2] NO symptoms  • Negative: [1] DOUBLE DOSE (an extra dose or lesser amount) of antibiotic drug AND [2] NO symptoms    Answer Assessment - Initial Assessment Questions  1. SYMPTOMS: \"Do you have any symptoms?\"      See note  2. SEVERITY: If symptoms are present, ask \"Are they mild, moderate or severe?\"      n/a    Protocols used: MEDICATION QUESTION CALL-ADULT-      "

## 2020-03-30 ENCOUNTER — NURSE TRIAGE (OUTPATIENT)
Dept: CALL CENTER | Facility: HOSPITAL | Age: 65
End: 2020-03-30

## 2020-03-30 NOTE — TELEPHONE ENCOUNTER
"Caller states that she has had back pain ever since she had her surgery for an aneurysm.  She states that she realizes that it is due from positioning and she is asking what are in [employer] pills.  Information reviewed and given to caller.    Reason for Disposition  • Caller has medication question, adult has minor symptoms, caller declines triage, and triager answers question    Additional Information  • Negative: Drug overdose and nurse unable to answer question  • Negative: Caller requesting information not related to medicine  • Negative: Caller requesting a prescription for Strep throat and has a positive culture result  • Negative: Rash while taking a medication or within 3 days of stopping it  • Negative: Immunization reaction suspected  • Negative: [1] Asthma and [2] having symptoms of asthma (cough, wheezing, etc)  • Negative: MORE THAN A DOUBLE DOSE of a prescription or over-the-counter (OTC) drug  • Negative: [1] DOUBLE DOSE (an extra dose or lesser amount) of over-the-counter (OTC) drug AND [2] any symptoms (e.g., dizziness, nausea, pain, sleepiness)  • Negative: [1] DOUBLE DOSE (an extra dose or lesser amount) of prescription drug AND [2] any symptoms (e.g., dizziness, nausea, pain, sleepiness)  • Negative: Took another person's prescription drug  • Negative: [1] DOUBLE DOSE (an extra dose or lesser amount) of prescription drug AND [2] NO symptoms (Exception: a double dose of antibiotics)  • Negative: Diabetes drug error or overdose (e.g., insulin or extra dose)  • Negative: [1] Request for URGENT new prescription or refill of \"essential\" medication (i.e., likelihood of harm to patient if not taken) AND [2] triager unable to fill per unit policy  • Negative: [1] Prescription not at pharmacy AND [2] was prescribed today by PCP  • Negative: Pharmacy calling with prescription questions and triager unable to answer question  • Negative: Caller has URGENT medication question about med that PCP " "prescribed and triager unable to answer question  • Negative: Caller has NON-URGENT medication question about med that PCP prescribed and triager unable to answer question  • Negative: Caller requesting a NON-URGENT new prescription or refill and triager unable to refill per unit policy  • Negative: Caller has medication question about med not prescribed by PCP and triager unable to answer question (e.g., compatibility with other med, storage)  • Negative: [1] DOUBLE DOSE (an extra dose or lesser amount) of over-the-counter (OTC) drug AND [2] NO symptoms  • Negative: [1] DOUBLE DOSE (an extra dose or lesser amount) of antibiotic drug AND [2] NO symptoms  • Negative: Caller has medication question only, adult not sick, and triager answers question  • Negative: Caller requesting information about medication during pregnancy; adult is not ill and triager answers question  • Negative: Caller requesting information about medication use with breastfeeding; neither adult nor infant is ill, and triager answers question  • Negative: Caller requesting a refill, no triage required, and triager able to refill per unit policy    Answer Assessment - Initial Assessment Questions  1. SYMPTOMS: \"Do you have any symptoms?\"      yes  2. SEVERITY: If symptoms are present, ask \"Are they mild, moderate or severe?\"      mild    Protocols used: MEDICATION QUESTION CALL-ADULT-      "

## 2020-07-15 ENCOUNTER — TELEPHONE (OUTPATIENT)
Dept: VASCULAR SURGERY | Facility: CLINIC | Age: 65
End: 2020-07-15

## 2020-07-15 NOTE — TELEPHONE ENCOUNTER
Ms Daniels called in asked I return her call.     When returning Ms Daniels called she answered I let her know I was returning her call and then she got quite and I never could here anything else the call hung up.

## 2020-08-10 DIAGNOSIS — I71.43 ANEURYSM OF INFRARENAL ABDOMINAL AORTA (HCC): Primary | ICD-10-CM

## 2020-08-10 DIAGNOSIS — I71.40 ABDOMINAL AORTIC ANEURYSM (AAA) WITHOUT RUPTURE (HCC): ICD-10-CM

## 2020-09-09 ENCOUNTER — TELEPHONE (OUTPATIENT)
Dept: VASCULAR SURGERY | Facility: CLINIC | Age: 65
End: 2020-09-09

## 2020-09-09 NOTE — TELEPHONE ENCOUNTER
Left message reminding Ms Daniels of her appointments for Thursday, September 10th, 2020. Reminded Ms Daniels to [facility] at the [facility] at 8 am with nothing to eat or drink 6 hours prior to testing and follow up afterwards at 930 am with Dr Ruiz. I did let Ms Daniels know it is a possibility that Dr Ruiz would be in surgery and she would be seeing Stacia GOMEZ. I did advise Ms Daniels if she had any questions or needed to reschedule to please call the office at [phone].

## 2020-09-10 ENCOUNTER — HOSPITAL ENCOUNTER (OUTPATIENT)
Dept: CT IMAGING | Facility: HOSPITAL | Age: 65
End: 2020-09-10

## 2020-09-13 ENCOUNTER — RESULTS ENCOUNTER (OUTPATIENT)
Dept: UROLOGY | Facility: CLINIC | Age: 65
End: 2020-09-13

## 2020-09-13 DIAGNOSIS — N26.1 ATROPHIC KIDNEY: ICD-10-CM

## 2020-09-14 ENCOUNTER — TELEPHONE (OUTPATIENT)
Dept: VASCULAR SURGERY | Facility: CLINIC | Age: 65
End: 2020-09-14

## 2020-09-14 NOTE — TELEPHONE ENCOUNTER
Left message reminding Ms Daniels of her appointments for Tuesday, September 15th, 2020. Reminded Ms Daniels to arrive at the [facility] at 9 am with nothing to eat or drink 6 hours prior and follow up afterwards at 945 am with Dr Ruiz. Also advised Ms Daniels if she had any questions or needed to reschedule to please call the office at [phone].

## 2020-09-15 ENCOUNTER — OFFICE VISIT (OUTPATIENT)
Dept: VASCULAR SURGERY | Facility: CLINIC | Age: 65
End: 2020-09-15

## 2020-09-15 ENCOUNTER — HOSPITAL ENCOUNTER (OUTPATIENT)
Dept: CT IMAGING | Facility: HOSPITAL | Age: 65
Discharge: HOME | End: 2020-09-15
Admitting: NURSE PRACTITIONER

## 2020-09-15 VITALS
OXYGEN SATURATION: 95 % | HEIGHT: 67 IN | BODY MASS INDEX: 23.7 KG/M2 | SYSTOLIC BLOOD PRESSURE: 126 MMHG | HEART RATE: 78 BPM | DIASTOLIC BLOOD PRESSURE: 80 MMHG | WEIGHT: 151 LBS

## 2020-09-15 DIAGNOSIS — I10 ESSENTIAL HYPERTENSION: ICD-10-CM

## 2020-09-15 DIAGNOSIS — I65.23 BILATERAL CAROTID ARTERY STENOSIS: ICD-10-CM

## 2020-09-15 DIAGNOSIS — I71.40 ABDOMINAL AORTIC ANEURYSM (AAA) WITHOUT RUPTURE (HCC): Primary | ICD-10-CM

## 2020-09-15 DIAGNOSIS — Z72.0 TOBACCO ABUSE: ICD-10-CM

## 2020-09-15 DIAGNOSIS — I71.40 ABDOMINAL AORTIC ANEURYSM (AAA) WITHOUT RUPTURE (HCC): ICD-10-CM

## 2020-09-15 LAB — CREAT BLDA-MCNC: 0.9 MG/DL (ref 0.6–1.3)

## 2020-09-15 PROCEDURE — 82565 ASSAY OF CREATININE: CPT

## 2020-09-15 PROCEDURE — 0 IOPAMIDOL PER 1 ML: Performed by: NURSE PRACTITIONER

## 2020-09-15 PROCEDURE — 99214 OFFICE O/P EST MOD 30 MIN: CPT | Performed by: SURGERY

## 2020-09-15 PROCEDURE — 74174 CTA ABD&PLVS W/CONTRAST: CPT

## 2020-09-15 RX ADMIN — IOPAMIDOL 100 ML: 755 INJECTION, SOLUTION INTRAVENOUS at 08:19

## 2020-09-15 NOTE — PROGRESS NOTES
"9/15/2020       Titi Bullock MD  [address]  [place] [address]        Robert Daniels  1955    Chief Complaint   Patient presents with   • Follow-up     6 Month Follow Up For Abdominal Aortic Aneurysm without Rupture. Test [id number] CT pad angiogram abd pelvis w wo. Patient denies any stroke like symptoms.    • Smoker/Non-Smoker     Patient is Current Everyday Smoker   • Med Management     Verbally verified medications with patient. Ms Daniels verbalized all medications are correct and up to date. Ms Daniels did verbalize she does not take 81 mg Aspirin.        Dear Titi Bullock MD:             I had the pleasure of seeing you patient in the office today for follow up.  As you recall, the patient is a 65 y.o. female who we recently saw for an abdominal aortic aneurysm.  She has a history of back problems and was having pain down both legs.  She had an incidental finding of a saccular 4.7 cm aneurysm found on a noncontrast CT.  She does smoke about 1/2 pack of cigarettes per day, and is working on quitting.  She denies any family history of aneurysms.    She underwent endovascular abdominal aortic aneurysm repair 1/22/2020.  She is maintained on Plavix and Zocor.  She did have noninvasive testing performed today, which I did review in office.     Review of Systems   Constitutional: Negative.    HENT: Negative.    Eyes: Negative.    Respiratory: Negative.    Cardiovascular: Negative.    Gastrointestinal: Negative.    Endocrine: Negative.    Genitourinary: Negative.    Musculoskeletal: Negative.    Skin: Negative.    Allergic/Immunologic: Negative.    Neurological: Negative.    Hematological: Negative.    Psychiatric/Behavioral: Negative.         /80 (BP Location: Right arm, Patient Position: Sitting, Cuff Size: Adult)   Pulse 78   Ht 168.9 cm (66.5\")   Wt 68.5 kg (151 lb)   SpO2 95%   BMI 24.01 kg/m²   Physical Exam  Vitals signs and nursing note reviewed.   Constitutional:       General: " She is not in acute distress.     Appearance: She is well-developed. She is not diaphoretic.   HENT:      Head: Normocephalic and atraumatic.   Eyes:      General: No scleral icterus.     Pupils: Pupils are equal, round, and reactive to light.   Neck:      Musculoskeletal: Normal range of motion and neck supple.      Thyroid: No thyromegaly.      Vascular: No carotid bruit or JVD.   Cardiovascular:      Rate and Rhythm: Normal rate and regular rhythm.      Pulses: Normal pulses.      Heart sounds: Normal heart sounds and S2 normal. No murmur. No friction rub. No gallop.    Pulmonary:      Effort: Pulmonary effort is normal.      Breath sounds: Normal breath sounds.   Abdominal:      General: Bowel sounds are normal.      Palpations: Abdomen is soft.      Tenderness: There is no abdominal tenderness.   Musculoskeletal: Normal range of motion.   Skin:     General: Skin is warm and dry.   Neurological:      Mental Status: She is alert and oriented to person, place, and time.      Cranial Nerves: No cranial nerve deficit.   Psychiatric:         Mood and Affect: Mood normal.         Behavior: Behavior normal.         Thought Content: Thought content normal.         Judgment: Judgment normal.       DIAGNOSTIC DATA:    Ct Angiogram Abdomen Pelvis    Result Date: 9/15/2020  Narrative: EXAMINATION:  CT ANGIOGRAM ABDOMEN PELVIS-  9/15/2020 8:17 AM CDT  HISTORY: Abdominal aortic aneurysm (AAA), known, follow up; I71.4-Abdominal aortic aneurysm, without rupture  COMPARISON: 3/5/2020 and 3/11/2019 CT angiogram the abdomen and pelvis  TECHNIQUE: CT angiography of the abdomen and pelvis was performed. Radiation dose equals  mGy-cm.  Automated exposure control dose reduction technique was implemented.  Thin section axial imaging was obtained with and without intravenous contrast. 2-D sagittal and coronal reconstruction images were generated.  Multiplanar Angiographic imaging obtained using maximum intensity projection  technique  FINDINGS:  ANGIOGRAPHIC FINDINGS:  Changes from endovascular repair of abdominal aortic aneurysm is again identified. The stent graft is intact.  There is no evidence of endovascular leak.  The thrombosed stent containing aneurysm measures approximately 3.8 x 5 cm with focal outpouching the level of the mid left kidney, stable.  The spleen mesenteric artery is patent and uncompromised.  Common hepatic and splenic artery arise separately but, patent without steno-occlusive changes.  The left kidney is atrophic.. There is asymmetric enhancement with of the left kidney, decreased lower pole suspect vascular insufficiency, stable. Suspect an accessory upper pole left renal artery with occluded/compromise lower pole renal artery  There are extensive atherosclerotic aortoiliac and femoral calcifications.  The iliac vessels are patent. The proximal femoral arteries are patent without significant steno-occlusive changes.  There is calcified and noncalcified atheromatous plaque identified in the distal thoracic aorta and suprarenal abdominal aorta.. These findings were noted previously.  ADDITIONAL FINDINGS:  There is cardiomegaly.  Lung bases are clear.  There is no CT evidence of gallstones. There are no focal hepatic lesions. There is no biliary ductal dilatation.  The spleen is not enlarged.  There are no adrenal masses.  There are no pancreatic abnormalities.  The right kidney is atrophic with diminished enhancement in the lower pole compatible with vascular insufficiency. There are no focal renal lesions. There is no pelvocaliectasis or obstructive uropathy change. The urinary bladder is incompletely distended without focal bladder wall abnormality.  Hysterectomy changes observed. There are no adnexal masses.  There is stool and gas identified throughout the colon including the rectum. There is no CT evidence of significant diverticular disease. Appendix is not inflamed.  Small bowel is not dilated.  The  duodenal sweep is imaged appropriately.  Stomach is not distended.  There are no pathologically enlarged lymph nodes.  There is no ascites or pneumoperitoneum.  There are spondylosis changes diffusely in the thoracolumbar spine.  There are no acute osseous abnormalities.  CT angiographic appearance of the abdomen pelvis is likely stable.       Impression: 1. Stable CT angiographic appearance the abdomen pelvis. 2. Endovascular repair of Abdominal aortic aneurysm. Patent stent without evidence of endovascular leak or postprocedural complications. This report was finalized on 09/15/2020 08:49 by Dr. Elvis Ward MD.      Patient Active Problem List   Diagnosis   • H/O vulvar dysplasia   • History of cervical cancer   • Tobacco abuse   • Hypertension   • Diabetes mellitus (CMS/HCC)   • Aneurysm of infrarenal abdominal aorta (CMS/HCC)   • COPD (chronic obstructive pulmonary disease) (CMS/HCC)   • Abdominal aortic aneurysm (AAA) without rupture (CMS/HCC)   • Preoperative respiratory examination   • Personal history of nicotine dependence   • Preop testing   • Generalized anxiety disorder   • AAA (abdominal aortic aneurysm) without rupture (CMS/HCC)   • AAA (abdominal aortic aneurysm) (CMS/HCC)         ICD-10-CM ICD-9-CM   1. Abdominal aortic aneurysm (AAA) without rupture (CMS/HCC)  I71.4 441.4   2. Essential hypertension  I10 401.9   3. Tobacco abuse  Z72.0 305.1   4. Bilateral carotid artery stenosis  I65.23 433.10     433.30       PLAN: After thoroughly evaluating Robert Daniels, I believe the best course of action is to remain conservative from vascular surgery standpoint.  She is doing well and denies any abdominal or back pain did review her CTA of abdomen pelvis which is stable and shows her aneurysm to be smaller.  We will see her back in 6 months with repeat noninvasive testing continued surveillance, including a CTA of the abdomen pelvis and a carotid duplex.  We also discussed vascular risk factors as it  pertains to the progression of vascular disease including controlling her hypertension, diabetes mellitus, and smoking cessation.    I did  extensively on smoking cessation, and the patient was advised of the continued risks of smoking.  I provided over 3 minutes counseling on this matter.  She is currently trying to cut down to eventually quit Body mass index is 24.01 kg/m². The patient is to continue taking their medications as previously discussed.   This was all discussed in full with complete understanding.  Thank you for allowing me to participate in the care of your patient.  Please do not hesitate to call with any questions or concerns.  We will keep you aware of any further encounters with Robert Daniels.      Sincerely Yours,      JASON Palumbo

## 2020-11-04 DIAGNOSIS — N20.0 NEPHROLITHIASIS: Primary | ICD-10-CM

## 2020-11-05 DIAGNOSIS — N20.0 NEPHROLITHIASIS: ICD-10-CM

## 2020-11-10 ENCOUNTER — TELEPHONE (OUTPATIENT)
Dept: UROLOGY | Facility: CLINIC | Age: 65
End: 2020-11-10

## 2020-12-21 ENCOUNTER — TELEPHONE (OUTPATIENT)
Dept: VASCULAR SURGERY | Facility: CLINIC | Age: 65
End: 2020-12-21

## 2020-12-21 NOTE — TELEPHONE ENCOUNTER
Pt called and said that she can hear her heartbeat in her ears and was wondering if this was from her stent.  I told her it was likely not the stent.  I asked her if she checked her BP when this happens.  She said yes, she did and it is always fine.  I then asked if she had any upper respiratory symptoms and she said yes.  I told her it was possible she could have fluid in her ears.  I told the patient that she should probably see her PCP to find the source of this issue.

## 2021-01-04 ENCOUNTER — TELEPHONE (OUTPATIENT)
Dept: UROLOGY | Facility: CLINIC | Age: 66
End: 2021-01-04

## 2021-01-04 NOTE — TELEPHONE ENCOUNTER
DR PATEL PATIENT    Patient called to cancel her 01/06 appointment because she was told she did not have her blood work done.  Patient rescheduled and wants to have bloodwork done at Dr Bullock's office.  Please fax an external order to Dr Bullock's office at [phone].  Patient rescheduled appointment for 02/18.  Please call her to let her know when to get her bloodwork done at Dr Bullock's office.

## 2021-01-06 ENCOUNTER — APPOINTMENT (OUTPATIENT)
Dept: ULTRASOUND IMAGING | Facility: HOSPITAL | Age: 66
End: 2021-01-06

## 2021-01-13 DIAGNOSIS — N26.1 ATROPHIC KIDNEY: Primary | ICD-10-CM

## 2021-02-18 ENCOUNTER — APPOINTMENT (OUTPATIENT)
Dept: ULTRASOUND IMAGING | Facility: HOSPITAL | Age: 66
End: 2021-02-18

## 2021-02-23 ENCOUNTER — TELEPHONE (OUTPATIENT)
Dept: UROLOGY | Facility: CLINIC | Age: 66
End: 2021-02-23

## 2021-02-25 DIAGNOSIS — N20.0 NEPHROLITHIASIS: Primary | ICD-10-CM

## 2021-02-25 DIAGNOSIS — N26.1 ATROPHIC KIDNEY: ICD-10-CM

## 2021-02-25 DIAGNOSIS — N26.1 ATROPHIC KIDNEY: Primary | ICD-10-CM

## 2021-03-15 ENCOUNTER — TELEPHONE (OUTPATIENT)
Dept: VASCULAR SURGERY | Facility: CLINIC | Age: 66
End: 2021-03-15

## 2021-03-15 NOTE — TELEPHONE ENCOUNTER
Spoke with Ms Daniels reminding her of her appointments for Tuesday, March 16th, 2021. Reminded [facility] am for 8 o'clock testing with nothing to eat or drink 6 hours prior and follow up afterwards at 10 am with Dr Ruiz. Ms Daniels confirmed she would be here.

## 2021-03-16 ENCOUNTER — HOSPITAL ENCOUNTER (OUTPATIENT)
Dept: ULTRASOUND IMAGING | Facility: HOSPITAL | Age: 66
Discharge: HOME | End: 2021-03-16

## 2021-03-16 ENCOUNTER — HOSPITAL ENCOUNTER (OUTPATIENT)
Dept: CT IMAGING | Facility: HOSPITAL | Age: 66
Discharge: HOME | End: 2021-03-16

## 2021-03-16 DIAGNOSIS — I65.23 BILATERAL CAROTID ARTERY STENOSIS: ICD-10-CM

## 2021-03-16 DIAGNOSIS — I71.40 ABDOMINAL AORTIC ANEURYSM (AAA) WITHOUT RUPTURE (HCC): ICD-10-CM

## 2021-03-16 LAB — CREAT BLDA-MCNC: 0.9 MG/DL (ref 0.6–1.3)

## 2021-03-16 PROCEDURE — 93880 EXTRACRANIAL BILAT STUDY: CPT

## 2021-03-16 PROCEDURE — 74174 CTA ABD&PLVS W/CONTRAST: CPT

## 2021-03-16 PROCEDURE — 82565 ASSAY OF CREATININE: CPT

## 2021-03-16 PROCEDURE — 93880 EXTRACRANIAL BILAT STUDY: CPT | Performed by: SURGERY

## 2021-03-16 PROCEDURE — 0 IOPAMIDOL PER 1 ML: Performed by: NURSE PRACTITIONER

## 2021-03-16 RX ADMIN — IOPAMIDOL 100 ML: 755 INJECTION, SOLUTION INTRAVENOUS at 09:02

## 2021-03-17 ENCOUNTER — TELEPHONE (OUTPATIENT)
Dept: VASCULAR SURGERY | Facility: CLINIC | Age: 66
End: 2021-03-17

## 2021-03-17 ENCOUNTER — TELEPHONE (OUTPATIENT)
Dept: PODIATRY | Facility: CLINIC | Age: 66
End: 2021-03-17

## 2021-03-17 NOTE — TELEPHONE ENCOUNTER
Spoke with Ms Daniels reminding her of her appointment for Thursday, March 18th, 2021 at 11 am with Dr Ruiz. Ms Daniels confirmed she would be here.

## 2021-03-17 NOTE — TELEPHONE ENCOUNTER
Pt called asking about test results from YISSEL Johnson. I assured the pt that Elizabeth will call her back, as she is in a room with a pt. Pt can be called back at [phone]

## 2021-03-18 ENCOUNTER — TELEPHONE (OUTPATIENT)
Dept: VASCULAR SURGERY | Facility: CLINIC | Age: 66
End: 2021-03-18

## 2021-03-18 DIAGNOSIS — I71.40 ABDOMINAL AORTIC ANEURYSM (AAA) WITHOUT RUPTURE (HCC): Primary | ICD-10-CM

## 2021-03-18 NOTE — TELEPHONE ENCOUNTER
Patient called and asked to make a 6 month follow up, I reminded her she had an apt today 03/18/2021 and she stated she was told she did not need to keep it because testing looked good and she also did not want to get out in the storms. I rescheduled her and then she asked to speak with his MA, so I transferred her back to the MA.

## 2021-04-01 ENCOUNTER — APPOINTMENT (OUTPATIENT)
Dept: ULTRASOUND IMAGING | Facility: HOSPITAL | Age: 66
End: 2021-04-01

## 2021-05-17 ENCOUNTER — APPOINTMENT (OUTPATIENT)
Dept: ULTRASOUND IMAGING | Facility: HOSPITAL | Age: 66
End: 2021-05-17

## 2021-05-24 ENCOUNTER — TRANSCRIBE ORDERS (OUTPATIENT)
Dept: ADMINISTRATIVE | Facility: HOSPITAL | Age: 66
End: 2021-05-24

## 2021-05-24 DIAGNOSIS — Z12.31 ENCOUNTER FOR SCREENING MAMMOGRAM FOR MALIGNANT NEOPLASM OF BREAST: Primary | ICD-10-CM

## 2021-07-08 ENCOUNTER — APPOINTMENT (OUTPATIENT)
Dept: ULTRASOUND IMAGING | Facility: HOSPITAL | Age: 66
End: 2021-07-08

## 2021-07-15 ENCOUNTER — APPOINTMENT (OUTPATIENT)
Dept: MAMMOGRAPHY | Facility: HOSPITAL | Age: 66
End: 2021-07-15

## 2021-09-15 ENCOUNTER — TELEPHONE (OUTPATIENT)
Dept: VASCULAR SURGERY | Facility: CLINIC | Age: 66
End: 2021-09-15

## 2021-09-15 NOTE — TELEPHONE ENCOUNTER
Left message reminding Ms Daniels of her appointment for Thursday, September 16th, 2021. Reminded Ms Daniels to arrive at the [facility] at 8 am for 830 am testing with nothing to eat or drink after midnight and follow up afterwards at 915 am with Dr Ruiz. Also advised Ms Daniels if she had any questions, concerns or needs to reschedule to please call the office at [phone].

## 2021-09-16 ENCOUNTER — HOSPITAL ENCOUNTER (OUTPATIENT)
Dept: ULTRASOUND IMAGING | Facility: HOSPITAL | Age: 66
End: 2021-09-16

## 2021-09-22 ENCOUNTER — TELEPHONE (OUTPATIENT)
Dept: VASCULAR SURGERY | Facility: CLINIC | Age: 66
End: 2021-09-22

## 2021-09-22 NOTE — TELEPHONE ENCOUNTER
Spoke with Ms Daniels reminding her of her appointments for Thursday, September 23rd, 2021. Reminded Ms Daniels to arrive at the [facility] at 8 am for 830 am testing with nothing to eat or drink after midnight and follow up afterwards at 915 am with Dr Ruiz. Ms Daniels confirmed she would be here.

## 2021-09-23 ENCOUNTER — TELEPHONE (OUTPATIENT)
Dept: VASCULAR SURGERY | Facility: CLINIC | Age: 66
End: 2021-09-23

## 2021-09-23 ENCOUNTER — HOSPITAL ENCOUNTER (OUTPATIENT)
Dept: ULTRASOUND IMAGING | Facility: HOSPITAL | Age: 66
Discharge: HOME | End: 2021-09-23
Admitting: NURSE PRACTITIONER

## 2021-09-23 DIAGNOSIS — I71.40 ABDOMINAL AORTIC ANEURYSM (AAA) WITHOUT RUPTURE (HCC): Primary | ICD-10-CM

## 2021-09-23 DIAGNOSIS — I71.40 ABDOMINAL AORTIC ANEURYSM (AAA) WITHOUT RUPTURE (HCC): ICD-10-CM

## 2021-09-23 PROCEDURE — 76775 US EXAM ABDO BACK WALL LIM: CPT

## 2021-09-23 NOTE — TELEPHONE ENCOUNTER
Spoke with Ms Saenzers letting her know that Dr Ruiz looked at her testing and everything was okay and would like to see her back in 6 months with CTA of the abd/pel. Ms Daniels verbalized understanding.           -

## 2021-11-22 ENCOUNTER — TELEPHONE (OUTPATIENT)
Dept: PODIATRY | Facility: CLINIC | Age: 66
End: 2021-11-22

## 2021-11-22 NOTE — TELEPHONE ENCOUNTER
Pt called and stated that she is having a lot of issues with keeping her blood pressure regulated.  She said that she was having issues with SOB and her PCP stopped her Norvasc.  She wondered if her AAA stent could be a reason for this.      I asked her if she was having any issues with pain in her abdomen.  She said no.  I told her that likely this was related to her blood pressure medication.  I advised her to contact her PCP's office to let them know what was going on and for her to take her list of BP readings.  She said that she would contact him.

## 2022-03-22 ENCOUNTER — APPOINTMENT (OUTPATIENT)
Dept: CT IMAGING | Facility: HOSPITAL | Age: 67
End: 2022-03-22

## 2022-03-28 ENCOUNTER — TELEPHONE (OUTPATIENT)
Dept: VASCULAR SURGERY | Facility: CLINIC | Age: 67
End: 2022-03-28

## 2022-03-28 NOTE — TELEPHONE ENCOUNTER
Tried calling to remind Ms Daniels of her appointment for Tuesday, March 29th, 2022. Tried calling to remind Ms Daniels to arrive at the [facility] at 745 am for 815 am with nothing to eat or drink 6 hours prior and follow up afterwards at 830 am with Stacia GOMEZ as Dr Ruiz is in Surgery.    When calling it rang several times and stated the voicemail box was full and could not accept any new messages.

## 2022-03-29 ENCOUNTER — HOSPITAL ENCOUNTER (OUTPATIENT)
Dept: CT IMAGING | Facility: HOSPITAL | Age: 67
End: 2022-03-29

## 2022-04-11 ENCOUNTER — TELEPHONE (OUTPATIENT)
Dept: VASCULAR SURGERY | Facility: CLINIC | Age: 67
End: 2022-04-11

## 2022-04-11 NOTE — TELEPHONE ENCOUNTER
Left message reminding Ms Daniels of her appointment for Tuesday, April 12th, 2022. Reminded Ms Daniels to arrive at the [facility] at 930 am for 10 o'clock testing with nothing to eat or drink 6 hours prior and follow up afterwards at 1030 am with Dr Ruiz. Also advised Ms Daniels if she had any questions, concerns or needs to reschedule to please call the office at [phone].

## 2022-04-12 ENCOUNTER — APPOINTMENT (OUTPATIENT)
Dept: CT IMAGING | Facility: HOSPITAL | Age: 67
End: 2022-04-12

## 2022-04-25 ENCOUNTER — TELEPHONE (OUTPATIENT)
Dept: VASCULAR SURGERY | Facility: CLINIC | Age: 67
End: 2022-04-25

## 2022-04-25 NOTE — TELEPHONE ENCOUNTER
Left message reminding Ms Daniels of her appointments for Tuesday, April 26th, 2022. Reminded Ms Daniels to arrive at the [facility] at 730 am for 8 o'clock testing and follow up afterwards at 845 am with Dr Ruiz.

## 2022-04-26 ENCOUNTER — APPOINTMENT (OUTPATIENT)
Dept: CT IMAGING | Facility: HOSPITAL | Age: 67
End: 2022-04-26

## 2022-05-05 ENCOUNTER — APPOINTMENT (OUTPATIENT)
Dept: CT IMAGING | Facility: HOSPITAL | Age: 67
End: 2022-05-05

## 2022-05-16 ENCOUNTER — TELEPHONE (OUTPATIENT)
Dept: VASCULAR SURGERY | Facility: CLINIC | Age: 67
End: 2022-05-16

## 2022-05-16 NOTE — TELEPHONE ENCOUNTER
Left message reminding Ms Daniels of her appointment for Tuesday, May 17th, 2022. Reminded Ms Daniels to arrive at the [facility] at 745 am for 815 am testing with nothing to eat or drink 6 hours prior and follow up afterwards at 845 am with Dr Ruiz.

## 2022-05-17 ENCOUNTER — APPOINTMENT (OUTPATIENT)
Dept: CT IMAGING | Facility: HOSPITAL | Age: 67
End: 2022-05-17

## 2022-05-25 ENCOUNTER — TELEPHONE (OUTPATIENT)
Dept: VASCULAR SURGERY | Facility: CLINIC | Age: 67
End: 2022-05-25

## 2022-05-25 NOTE — TELEPHONE ENCOUNTER
Confirmed appt with pt for arrival tomorrow, 5/26/22, at 8:15 at the [facility] with CT at 8:45 and NPO 6 hrs before. F/U with Dr Ruiz at 9:15.

## 2022-05-26 ENCOUNTER — HOSPITAL ENCOUNTER (OUTPATIENT)
Dept: CT IMAGING | Facility: HOSPITAL | Age: 67
End: 2022-05-26

## 2022-06-07 ENCOUNTER — APPOINTMENT (OUTPATIENT)
Dept: CT IMAGING | Facility: HOSPITAL | Age: 67
End: 2022-06-07

## 2022-06-14 ENCOUNTER — HOSPITAL ENCOUNTER (OUTPATIENT)
Dept: CT IMAGING | Facility: HOSPITAL | Age: 67
Discharge: HOME | End: 2022-06-14
Admitting: NURSE PRACTITIONER

## 2022-06-14 DIAGNOSIS — I71.40 ABDOMINAL AORTIC ANEURYSM (AAA) WITHOUT RUPTURE: ICD-10-CM

## 2022-06-14 LAB — CREAT BLDA-MCNC: 1 MG/DL (ref 0.6–1.3)

## 2022-06-14 PROCEDURE — 0 IOPAMIDOL PER 1 ML: Performed by: NURSE PRACTITIONER

## 2022-06-14 PROCEDURE — 82565 ASSAY OF CREATININE: CPT

## 2022-06-14 PROCEDURE — 74174 CTA ABD&PLVS W/CONTRAST: CPT

## 2022-06-14 RX ADMIN — IOPAMIDOL 100 ML: 755 INJECTION, SOLUTION INTRAVENOUS at 08:46

## 2022-06-17 ENCOUNTER — TELEPHONE (OUTPATIENT)
Dept: VASCULAR SURGERY | Facility: CLINIC | Age: 67
End: 2022-06-17

## 2022-06-21 ENCOUNTER — TELEPHONE (OUTPATIENT)
Dept: VASCULAR SURGERY | Facility: CLINIC | Age: 67
End: 2022-06-21

## 2022-06-21 NOTE — TELEPHONE ENCOUNTER
Left message reminding Ms Daniels of her appointment for Wednesday, June 22nd, 2022 at 1045 am with Stacia GOMEZ.

## 2022-06-28 ENCOUNTER — TELEPHONE (OUTPATIENT)
Dept: VASCULAR SURGERY | Facility: CLINIC | Age: 67
End: 2022-06-28

## 2022-06-29 ENCOUNTER — OFFICE VISIT (OUTPATIENT)
Dept: VASCULAR SURGERY | Facility: CLINIC | Age: 67
End: 2022-06-29

## 2022-06-29 VITALS
BODY MASS INDEX: 22.91 KG/M2 | HEIGHT: 67 IN | SYSTOLIC BLOOD PRESSURE: 130 MMHG | RESPIRATION RATE: 18 BRPM | DIASTOLIC BLOOD PRESSURE: 84 MMHG | OXYGEN SATURATION: 92 % | HEART RATE: 52 BPM | WEIGHT: 146 LBS

## 2022-06-29 DIAGNOSIS — I10 ESSENTIAL HYPERTENSION: ICD-10-CM

## 2022-06-29 DIAGNOSIS — Z72.0 TOBACCO ABUSE: ICD-10-CM

## 2022-06-29 DIAGNOSIS — I65.23 BILATERAL CAROTID ARTERY STENOSIS: ICD-10-CM

## 2022-06-29 DIAGNOSIS — I71.40 ABDOMINAL AORTIC ANEURYSM (AAA) WITHOUT RUPTURE: Primary | ICD-10-CM

## 2022-06-29 PROCEDURE — 99214 OFFICE O/P EST MOD 30 MIN: CPT | Performed by: NURSE PRACTITIONER

## 2022-06-29 RX ORDER — LOSARTAN POTASSIUM 50 MG/1
1 TABLET ORAL NIGHTLY
COMMUNITY
Start: 2022-05-12

## 2022-06-29 NOTE — PROGRESS NOTES
"6/29/2022       Titi Bullock MD  [address]  [place] [address]        Robert Daniels  1955    Chief Complaint   Patient presents with   • Aortic Aneurysm     6 month follow-up with CT pad angiogram abd pelvis w wo 6/14/22.  Pt is a Current Smoker.  Pt states she has been having headaches, but could be due to BP med changes. Pt denies any stroke-like symptoms.       Dear Titi Bullock MD:             I had the pleasure of seeing you patient in the office today for follow up.  As you recall, the patient is a 67 y.o. female who we recently saw for an abdominal aortic aneurysm.  She had an incidental finding of a saccular 4.7 cm aneurysm found on a noncontrast CT.  She denies any family history of aneurysms.  She underwent endovascular abdominal aortic aneurysm repair on 1/22/2020.  She does have chronic back pain with bilateral lower extremity radiculopathy.  She is maintained on Plavix and Zocor.  Unfortunately she is a current daily smoker.  She did have noninvasive testing performed, which I did review in office.      Review of Systems   Constitutional: Negative.    HENT: Negative.    Eyes: Negative.    Respiratory: Negative.    Cardiovascular: Negative.    Gastrointestinal: Negative.    Endocrine: Negative.    Genitourinary: Negative.    Musculoskeletal: Positive for back pain (unchanged).   Skin: Negative.    Allergic/Immunologic: Negative.    Neurological: Negative.    Hematological: Negative.    Psychiatric/Behavioral: Negative.         /84 (BP Location: Left arm, Patient Position: Sitting, Cuff Size: Adult)   Pulse 52   Resp 18   Ht 168.9 cm (66.5\")   Wt 66.2 kg (146 lb)   SpO2 92%   BMI 23.21 kg/m²   Physical Exam  Vitals and nursing note reviewed.   Constitutional:       General: She is not in acute distress.     Appearance: Normal appearance. She is well-developed and normal weight. She is not diaphoretic.   HENT:      Head: Normocephalic and atraumatic.   Eyes:      " General: No scleral icterus.     Pupils: Pupils are equal, round, and reactive to light.   Neck:      Thyroid: No thyromegaly.      Vascular: No carotid bruit or JVD.   Cardiovascular:      Rate and Rhythm: Normal rate and regular rhythm.      Pulses: Normal pulses.      Heart sounds: Normal heart sounds and S2 normal. No murmur heard.    No friction rub. No gallop.   Pulmonary:      Effort: Pulmonary effort is normal.      Breath sounds: Normal breath sounds.   Abdominal:      General: Bowel sounds are normal.      Palpations: Abdomen is soft.   Musculoskeletal:         General: Normal range of motion.      Cervical back: Normal range of motion and neck supple.   Skin:     General: Skin is warm and dry.   Neurological:      General: No focal deficit present.      Mental Status: She is alert and oriented to person, place, and time.      Cranial Nerves: No cranial nerve deficit.   Psychiatric:         Mood and Affect: Mood normal.         Behavior: Behavior normal.         Thought Content: Thought content normal.         Judgment: Judgment normal.          DIAGNOSTIC DATA:    CT Angiogram Abdomen Pelvis    Result Date: 6/14/2022  Narrative: EXAMINATION: CT ANGIOGRAM ABDOMEN PELVIS-   6/14/2022 8:24 AM CDT  HISTORY: Aortic aneurysm (AAA), surveillance; I71.4-Abdominal aortic aneurysm, without rupture  In order to have a CT radiation dose as low as reasonably achievable Automated Exposure Control was utilized for adjustment of the mA and/or KV according to patient size.  DLP in mGycm= 286.  CT angiogram abdomen/pelvis. CT angiography protocol. CT imaging with bolus IV contrast injection. Under concurrent supervision axial, sagittal, coronal, and three-dimensional data sets were constructed.  Comparison is made with March 16, 2021.  Noncontrast: Arterial calcification. Pelvic phleboliths.  Postcontrast: No endograft leak. Maximum infrarenal aorta size is 36 x 37 mm compared with 37 x 38 mm.  Normal heart size.  Hyperexpanded lung bases. Tiny calcified gallstone. No biliary dilation. Normal liver, pancreas, spleen, and adrenal glands. Normal left kidney. Moderately atrophic right kidney is unchanged.  No bowel dilation or free fluid.  Summary: 1. Stable AAA with endograft. 2. No endograft leak. 3. Aorta size is stable or slightly decreased compared with 3 months ago.            This report was finalized on 06/14/2022 09:10 by Dr. Lucio Cheng MD.      Patient Active Problem List   Diagnosis   • H/O vulvar dysplasia   • History of cervical cancer   • Tobacco abuse   • Hypertension   • Diabetes mellitus (HCC)   • Aneurysm of infrarenal abdominal aorta (HCC)   • COPD (chronic obstructive pulmonary disease) (HCC)   • Abdominal aortic aneurysm (AAA) without rupture (HCC)   • Preoperative respiratory examination   • Personal history of nicotine dependence   • Preop testing   • Generalized anxiety disorder   • AAA (abdominal aortic aneurysm) without rupture (HCC)   • AAA (abdominal aortic aneurysm) (HCC)         ICD-10-CM ICD-9-CM   1. Abdominal aortic aneurysm (AAA) without rupture (HCC)  I71.4 441.4   2. Essential hypertension  I10 401.9   3. Tobacco abuse  Z72.0 305.1   4. Bilateral carotid artery stenosis  I65.23 433.10     433.30       PLAN: After thoroughly evaluating Robert Daniels, I believe the best course of action is to remain conservative from vascular surgery standpoint.  Currently she is doing well and denies any abdominal pain.  I did review her CTA of the abdomen and pelvis which shows her aneurysm to be well excluded with no evidence of endoleak.  We will see her back in 6 months with repeat noninvasive testing including an ultrasound of the aorta and a carotid duplex.  I did offer her 1 year follow-up however she would like to continue to follow every 6 months.  We discussed a vascular risk factors as it pertains to the progression of vascular disease including controlling her hypertension, diabetes, and smoking  cessation.  Her blood pressure stable on her current medications.  Unfortunately she does continue to smoke however reports she is cutting back.  The patient is to continue taking their medications as previously discussed.   This was all discussed in full with complete understanding.  Thank you for allowing me to participate in the care of your patient.  Please do not hesitate to call with any questions or concerns.  We will keep you aware of any further encounters with Robert Daniels.      Sincerely Yours,      JASON Palumbo

## 2022-11-09 ENCOUNTER — TELEPHONE (OUTPATIENT)
Dept: VASCULAR SURGERY | Facility: CLINIC | Age: 67
End: 2022-11-09

## 2022-11-09 NOTE — TELEPHONE ENCOUNTER
Patient called asking if she could use Ibuprofen due to a cramp in her neck. Stated that Sara/Stacia told her not to use Ibuprofen due to pt being on Plavix. I advised her not to use the Plavix due to her being instructed not to. Told her that she could try tylenol, a heating pad or an ice pack to help with cramps.

## 2023-01-11 ENCOUNTER — APPOINTMENT (OUTPATIENT)
Dept: ULTRASOUND IMAGING | Facility: HOSPITAL | Age: 68
End: 2023-01-11
Payer: MEDICARE

## 2023-04-21 ENCOUNTER — TELEPHONE (OUTPATIENT)
Dept: VASCULAR SURGERY | Facility: CLINIC | Age: 68
End: 2023-04-21
Payer: MEDICARE

## 2023-06-02 ENCOUNTER — TELEPHONE (OUTPATIENT)
Dept: VASCULAR SURGERY | Facility: CLINIC | Age: 68
End: 2023-06-02

## (undated) DEVICE — CATH SZ ACCUVU SEG/20CM OMNI 5F 100CM

## (undated) DEVICE — CVR PROB GEN PURP W ISOSILK 6X48

## (undated) DEVICE — GUIDE ANCH/ENDOVASC HELIFX/SYS 16F 22X62CM

## (undated) DEVICE — DRP SPECIAL PROC 4PC W/FC POUCH

## (undated) DEVICE — DRSNG TELFA PAD NONADH STR 1S 3X8IN

## (undated) DEVICE — PROXIMATE RH ROTATING HEAD SKIN STAPLERS (35 WIDE) CONTAINS 35 STAINLESS STEEL STAPLES: Brand: PROXIMATE

## (undated) DEVICE — ANTIBACTERIAL UNDYED BRAIDED (POLYGLACTIN 910), SYNTHETIC ABSORBABLE SUTURE: Brand: COATED VICRYL

## (undated) DEVICE — SYR CONTRL LUERLOK 10CC

## (undated) DEVICE — CANN VESL ACRN TP 4MM

## (undated) DEVICE — DORADO® PTA BALLOON DILATATION CATHETER 9 MM X 40 MM, 40 CM CATHETER: Brand: DORADO®

## (undated) DEVICE — GLV SURG BIOGEL LTX PF 7 1/2

## (undated) DEVICE — NDL HYPO PRECISIONGLIDE REG 22G 1 1/2

## (undated) DEVICE — CATH FLSH OMNI SFT 5F 90CM

## (undated) DEVICE — SUT PROLN 5/0 C1 DA 24IN 8725H

## (undated) DEVICE — GAUZE,SPONGE,4"X4",16PLY,XRAY,STRL,LF: Brand: MEDLINE

## (undated) DEVICE — PAD MAJOR VASCULAR: Brand: MEDLINE INDUSTRIES, INC.

## (undated) DEVICE — RADIFOCUS GLIDEWIRE ADVANTAGE GUIDEWIRE: Brand: GLIDEWIRE ADVANTAGE

## (undated) DEVICE — MODEL AT P65, P/N 701554-001KIT CONTENTS: HAND CONTROLLER, 3-WAY HIGH-PRESSURE STOPCOCK WITH ROTATING END AND PREMIUM HIGH-PRESSURE TUBING: Brand: ANGIOTOUCH® KIT

## (undated) DEVICE — 3M™ STERI-STRIP™ REINFORCED ADHESIVE SKIN CLOSURES, R1547, 1/2 IN X 4 IN (12 MM X 100 MM), 6 STRIPS/ENVELOPE: Brand: 3M™ STERI-STRIP™

## (undated) DEVICE — CLTH CLENS READYCLEANSE PERI CARE PK/5

## (undated) DEVICE — SNAP KOVER: Brand: UNBRANDED

## (undated) DEVICE — SUT MNCRYL 4/0 PS2 27IN UD MCP426H

## (undated) DEVICE — BALN STENTGR RELIANT 8F 100CM

## (undated) DEVICE — PINNACLE R/O II INTRODUCER SHEATH WITH RADIOPAQUE MARKER: Brand: PINNACLE

## (undated) DEVICE — SPNG GZ WOVN 4X4IN 12PLY 10/BX STRL

## (undated) DEVICE — PINNACLE INTRODUCER SHEATH: Brand: PINNACLE

## (undated) DEVICE — SHEATH SENTRANT 16F 28CM

## (undated) DEVICE — GLV SURG BIOGEL LTX PF 6 1/2

## (undated) DEVICE — BG BANDED WRUBBER BAND AND TP 36X54IN

## (undated) DEVICE — SYR LUERLOK 50ML

## (undated) DEVICE — A2000 MULTI-USE SYRINGE KIT, P/N 701277-003KIT CONTENTS: 100ML CONTRAST RESERVOIR AND TUBING WITH CONTRAST SPIKE AND CLAMP: Brand: A2000 MULTI-USE SYRINGE KIT

## (undated) DEVICE — APPL CHLORAPREP W/TINT 26ML ORNG

## (undated) DEVICE — RADIFOCUS GLIDECATH: Brand: GLIDECATH

## (undated) DEVICE — TOTAL TRAY, 16FR 10ML SIL FOLEY, URN: Brand: MEDLINE

## (undated) DEVICE — PERCLOSE PROGLIDE™ SUTURE-MEDIATED CLOSURE SYSTEM: Brand: PERCLOSE PROGLIDE™

## (undated) DEVICE — INFLATION DEVICE: Brand: ENCORE™ 26

## (undated) DEVICE — MODEL BT2000 P/N 700287-012KIT CONTENTS: MANIFOLD WITH SALINE AND CONTRAST PORTS, SALINE TUBING WITH SPIKE AND HAND SYRINGE, TRANSDUCER: Brand: BT2000 AUTOMATED MANIFOLD KIT

## (undated) DEVICE — ST MIC/INTRO ACC SHRP/NDL TUNG/TP NITNL 5F 45CM 7CM

## (undated) DEVICE — DRSNG SURESITE WNDW 4X4.5

## (undated) DEVICE — DS ANCH/ENDOVASC HELIFX/86CM W/10/ENDOANCHOR/0.5X3X4.5MM

## (undated) DEVICE — PK TURNOVER RM ADV